# Patient Record
Sex: FEMALE | Race: WHITE | NOT HISPANIC OR LATINO
[De-identification: names, ages, dates, MRNs, and addresses within clinical notes are randomized per-mention and may not be internally consistent; named-entity substitution may affect disease eponyms.]

---

## 2017-08-24 ENCOUNTER — CHARTING TRANS (OUTPATIENT)
Dept: OTHER | Age: 52
End: 2017-08-24

## 2017-11-10 ENCOUNTER — LAB SERVICES (OUTPATIENT)
Dept: OTHER | Age: 52
End: 2017-11-10

## 2017-11-20 LAB
ALBUMIN SERPL-MCNC: 3.6 G/DL (ref 3.6–5.1)
ALBUMIN/GLOB SERPL: 1.1 (ref 1–2.4)
ALP SERPL-CCNC: 65 UNITS/L (ref 45–117)
ALT SERPL-CCNC: 24 UNITS/L
ANION GAP SERPL CALC-SCNC: 13 MMOL/L (ref 10–20)
APPEARANCE UR: CLEAR
AST SERPL-CCNC: 15 UNITS/L
BASOPHILS # BLD: 0.1 K/MCL (ref 0–0.3)
BASOPHILS NFR BLD: 1 %
BILIRUB SERPL-MCNC: 0.2 MG/DL (ref 0.2–1)
BILIRUB UR QL: NEGATIVE
BUN SERPL-MCNC: 14 MG/DL (ref 6–20)
BUN/CREAT SERPL: 22 (ref 7–25)
CALCIUM SERPL-MCNC: 8.1 MG/DL (ref 8.4–10.2)
CHLORIDE SERPL-SCNC: 106 MMOL/L (ref 98–107)
CHOLEST SERPL-MCNC: 167 MG/DL
CHOLEST/HDLC SERPL: 3.5
CO2 SERPL-SCNC: 26 MMOL/L (ref 21–32)
COLOR UR: ABNORMAL
CREAT SERPL-MCNC: 0.65 MG/DL (ref 0.51–0.95)
DIFFERENTIAL METHOD BLD: ABNORMAL
EOSINOPHIL # BLD: 0.2 K/MCL (ref 0.1–0.5)
EOSINOPHIL NFR BLD: 3 %
ERYTHROCYTE [DISTWIDTH] IN BLOOD: 14.8 % (ref 11–15)
GLOBULIN SER-MCNC: 3.4 G/DL (ref 2–4)
GLUCOSE SERPL-MCNC: 77 MG/DL (ref 65–99)
GLUCOSE UR-MCNC: NEGATIVE MG/DL
HDLC SERPL-MCNC: 48 MG/DL
HEMATOCRIT: 36.3 % (ref 36–46.5)
HEMOGLOBIN: 11.1 G/DL (ref 12–15.5)
KETONES UR-MCNC: NEGATIVE MG/DL
LDLC SERPL CALC-MCNC: 93 MG/DL
LENGTH OF FAST TIME PATIENT: 12 HRS
LENGTH OF FAST TIME PATIENT: 12 HRS
LYMPHOCYTES # BLD: 1.5 K/MCL (ref 1–4)
LYMPHOCYTES NFR BLD: 24 %
MEAN CORPUSCULAR HEMOGLOBIN: 24.1 PG (ref 26–34)
MEAN CORPUSCULAR HGB CONC: 30.6 G/DL (ref 32–36.5)
MEAN CORPUSCULAR VOLUME: 78.7 FL (ref 78–100)
MONOCYTES # BLD: 0.6 K/MCL (ref 0.3–0.9)
MONOCYTES NFR BLD: 10 %
NEUTROPHILS # BLD: 4.2 K/MCL (ref 1.8–7.7)
NEUTROPHILS NFR BLD: 62 %
NITRITE UR QL: NEGATIVE
NONHDLC SERPL-MCNC: 119 MG/DL
PH UR: 7 UNITS (ref 5–7)
PLATELET COUNT: 293 K/MCL (ref 140–450)
POTASSIUM SERPL-SCNC: 4.2 MMOL/L (ref 3.4–5.1)
PROT UR QL: NEGATIVE MG/DL
RBC-URINE: NEGATIVE
RED CELL COUNT: 4.61 MIL/MCL (ref 4–5.2)
SODIUM SERPL-SCNC: 141 MMOL/L (ref 135–145)
SP GR UR: 1.01 (ref 1–1.03)
SPECIMEN SOURCE: ABNORMAL
TOTAL PROTEIN: 7 G/DL (ref 6.4–8.2)
TRIGL SERPL-MCNC: 132 MG/DL
TSH SERPL-ACNC: 0.42 MCUNITS/ML (ref 0.35–5)
UROBILINOGEN UR QL: 0.2 MG/DL (ref 0–1)
WBC-URINE: NEGATIVE
WHITE BLOOD COUNT: 6.5 K/MCL (ref 4.2–11)

## 2018-03-19 ENCOUNTER — LAB SERVICES (OUTPATIENT)
Dept: OTHER | Age: 53
End: 2018-03-19

## 2018-03-19 ENCOUNTER — CHARTING TRANS (OUTPATIENT)
Dept: OTHER | Age: 53
End: 2018-03-19

## 2018-03-27 ENCOUNTER — CHARTING TRANS (OUTPATIENT)
Dept: OTHER | Age: 53
End: 2018-03-27

## 2018-03-27 LAB
25(OH)D3+25(OH)D2 SERPL-MCNC: 50.3 NG/ML (ref 30–100)
BASOPHILS # BLD: 0.1 K/MCL (ref 0–0.3)
BASOPHILS NFR BLD: 1 %
DIFFERENTIAL METHOD BLD: ABNORMAL
EOSINOPHIL # BLD: 0.2 K/MCL (ref 0.1–0.5)
EOSINOPHIL NFR BLD: 3 %
ERYTHROCYTE [DISTWIDTH] IN BLOOD: 15 % (ref 11–15)
FERRITIN SERPL-MCNC: 4 NG/ML (ref 8–252)
HEMATOCRIT: 37.2 % (ref 36–46.5)
HEMOCCULT STL QL: NEGATIVE
HEMOGLOBIN: 11 G/DL (ref 12–15.5)
LYMPHOCYTES # BLD: 1.7 K/MCL (ref 1–4)
LYMPHOCYTES NFR BLD: 28 %
MEAN CORPUSCULAR HEMOGLOBIN: 22.4 PG (ref 26–34)
MEAN CORPUSCULAR HGB CONC: 29.6 G/DL (ref 32–36.5)
MEAN CORPUSCULAR VOLUME: 75.9 FL (ref 78–100)
MONOCYTES # BLD: 0.8 K/MCL (ref 0.3–0.9)
MONOCYTES NFR BLD: 13 %
NEUTROPHILS # BLD: 3.4 K/MCL (ref 1.8–7.7)
NEUTROPHILS NFR BLD: 55 %
PLATELET COUNT: 249 K/MCL (ref 140–450)
RED CELL COUNT: 4.9 MIL/MCL (ref 4–5.2)
WHITE BLOOD COUNT: 6 K/MCL (ref 4.2–11)

## 2018-10-29 PROCEDURE — 88305 TISSUE EXAM BY PATHOLOGIST: CPT | Performed by: NURSE PRACTITIONER

## 2018-11-01 VITALS
DIASTOLIC BLOOD PRESSURE: 72 MMHG | WEIGHT: 173 LBS | TEMPERATURE: 97.7 F | BODY MASS INDEX: 24.77 KG/M2 | HEART RATE: 84 BPM | SYSTOLIC BLOOD PRESSURE: 116 MMHG | OXYGEN SATURATION: 98 % | HEIGHT: 70 IN

## 2018-11-03 VITALS
SYSTOLIC BLOOD PRESSURE: 124 MMHG | DIASTOLIC BLOOD PRESSURE: 81 MMHG | WEIGHT: 175.38 LBS | TEMPERATURE: 98 F | HEART RATE: 66 BPM | RESPIRATION RATE: 16 BRPM | HEIGHT: 70 IN | BODY MASS INDEX: 25.11 KG/M2

## 2020-02-07 ENCOUNTER — WALK IN (OUTPATIENT)
Dept: URGENT CARE | Age: 55
End: 2020-02-07

## 2020-02-07 DIAGNOSIS — J11.1 INFLUENZA: Primary | ICD-10-CM

## 2020-02-07 PROCEDURE — 99213 OFFICE O/P EST LOW 20 MIN: CPT | Performed by: NURSE PRACTITIONER

## 2020-02-07 RX ORDER — FLUTICASONE PROPIONATE 50 MCG
SPRAY, SUSPENSION (ML) NASAL
COMMUNITY

## 2020-02-07 RX ORDER — PAROXETINE 10 MG/1
TABLET, FILM COATED ORAL
Refills: 11 | COMMUNITY
Start: 2020-01-02

## 2020-02-07 SDOH — HEALTH STABILITY: PHYSICAL HEALTH: ON AVERAGE, HOW MANY MINUTES DO YOU ENGAGE IN EXERCISE AT THIS LEVEL?: 30 MIN

## 2020-02-07 SDOH — HEALTH STABILITY: MENTAL HEALTH: HOW OFTEN DO YOU HAVE A DRINK CONTAINING ALCOHOL?: 2-3 TIMES A WEEK

## 2020-02-07 SDOH — HEALTH STABILITY: PHYSICAL HEALTH: ON AVERAGE, HOW MANY DAYS PER WEEK DO YOU ENGAGE IN MODERATE TO STRENUOUS EXERCISE (LIKE A BRISK WALK)?: 2 DAYS

## 2020-02-07 ASSESSMENT — PAIN SCALES - GENERAL: PAINLEVEL: 7-8

## 2020-02-07 ASSESSMENT — ENCOUNTER SYMPTOMS
SHORTNESS OF BREATH: 0
RHINORRHEA: 0
LIGHT-HEADEDNESS: 0
HEADACHES: 0
FEVER: 1
VOMITING: 0
APPETITE CHANGE: 0
COLOR CHANGE: 0
FATIGUE: 1
WHEEZING: 0
CHILLS: 1
SINUS PAIN: 1
NUMBNESS: 0
DIZZINESS: 0
ADENOPATHY: 0
DIAPHORESIS: 1
BRUISES/BLEEDS EASILY: 0
ACTIVITY CHANGE: 1
COUGH: 1
DIARRHEA: 0
NAUSEA: 0
ABDOMINAL PAIN: 0
CONSTIPATION: 0
ABDOMINAL DISTENTION: 0
SINUS PRESSURE: 1
CHEST TIGHTNESS: 0

## 2020-10-21 ENCOUNTER — OFFICE VISIT (OUTPATIENT)
Dept: FAMILY MEDICINE CLINIC | Facility: CLINIC | Age: 55
End: 2020-10-21
Payer: COMMERCIAL

## 2020-10-21 VITALS
RESPIRATION RATE: 16 BRPM | SYSTOLIC BLOOD PRESSURE: 122 MMHG | HEART RATE: 75 BPM | DIASTOLIC BLOOD PRESSURE: 70 MMHG | BODY MASS INDEX: 28.02 KG/M2 | TEMPERATURE: 97 F | WEIGHT: 187 LBS | HEIGHT: 68.5 IN | OXYGEN SATURATION: 95 %

## 2020-10-21 DIAGNOSIS — Z00.00 ROUTINE GENERAL MEDICAL EXAMINATION AT A HEALTH CARE FACILITY: Primary | ICD-10-CM

## 2020-10-21 DIAGNOSIS — Z12.11 ENCOUNTER FOR HEMOCCULT SCREENING: ICD-10-CM

## 2020-10-21 DIAGNOSIS — Z86.2 HISTORY OF ANEMIA: ICD-10-CM

## 2020-10-21 DIAGNOSIS — Z12.11 SCREENING FOR MALIGNANT NEOPLASM OF COLON: ICD-10-CM

## 2020-10-21 DIAGNOSIS — Z79.899 ENCOUNTER FOR LONG-TERM CURRENT USE OF MEDICATION: ICD-10-CM

## 2020-10-21 DIAGNOSIS — G43.909 MIGRAINE WITHOUT STATUS MIGRAINOSUS, NOT INTRACTABLE, UNSPECIFIED MIGRAINE TYPE: ICD-10-CM

## 2020-10-21 PROCEDURE — 99386 PREV VISIT NEW AGE 40-64: CPT | Performed by: FAMILY MEDICINE

## 2020-10-21 PROCEDURE — 3078F DIAST BP <80 MM HG: CPT | Performed by: FAMILY MEDICINE

## 2020-10-21 PROCEDURE — 3074F SYST BP LT 130 MM HG: CPT | Performed by: FAMILY MEDICINE

## 2020-10-21 PROCEDURE — 90686 IIV4 VACC NO PRSV 0.5 ML IM: CPT | Performed by: FAMILY MEDICINE

## 2020-10-21 PROCEDURE — 90471 IMMUNIZATION ADMIN: CPT | Performed by: FAMILY MEDICINE

## 2020-10-21 PROCEDURE — 99213 OFFICE O/P EST LOW 20 MIN: CPT | Performed by: FAMILY MEDICINE

## 2020-10-21 PROCEDURE — 3008F BODY MASS INDEX DOCD: CPT | Performed by: FAMILY MEDICINE

## 2020-10-21 RX ORDER — ELETRIPTAN HYDROBROMIDE 20 MG/1
20 TABLET, FILM COATED ORAL AS NEEDED
Qty: 27 TABLET | Refills: 1 | Status: SHIPPED | OUTPATIENT
Start: 2020-10-21 | End: 2021-10-25

## 2020-10-21 NOTE — PROGRESS NOTES
HPI:   Maynor Goodman is a 48year old female     New patient. Last PAP: UTD  Last colonoscopy: n/a  Last mammogram: scheduled, ordered by gyne      Migraine headaches:  Most frequently around the time of her menses.   Other triggers include when she get Years since quittin.8      Smokeless tobacco: Never Used    Alcohol use: Yes      Frequency: 2-4 times a month      Drinks per session: 3 or 4      Comment: socially    Drug use: No    Occ: [facility] [place] as .  Marital cyanosis, or edema  NEURO: oriented times three, cranial nerves are grossly intact, no gross motor or sensory deficit.   PSYCH: normal affect        Immunization History  Administered            Date(s) Administered    FLULAVAL 6 months & older 0.5 ml Prefi as needed. Avoid known triggers when able. Maintain good hydration. Sleep hygiene discussed. Try to avoid taking Relpax more than twice a week as this can increase risk of rebound headaches. - Eletriptan Hydrobromide 20 MG Oral Tab;  Take 1 tablet (2

## 2020-10-22 ENCOUNTER — TELEPHONE (OUTPATIENT)
Dept: FAMILY MEDICINE CLINIC | Facility: CLINIC | Age: 55
End: 2020-10-22

## 2020-10-22 NOTE — TELEPHONE ENCOUNTER
Eletriptan Hydrobromide 20 MG Oral Tab 27 tablet 1 10/21/2020    Sig:   Take 1 tablet (20 mg total) by mouth as needed for Migraine. Per Paul, they would like to confirm instructions are correct.  Medication is to be taken as needed only for migr

## 2020-10-24 ENCOUNTER — LAB ENCOUNTER (OUTPATIENT)
Dept: LAB | Age: 55
End: 2020-10-24
Attending: FAMILY MEDICINE
Payer: COMMERCIAL

## 2020-10-24 DIAGNOSIS — Z86.2 HISTORY OF ANEMIA: ICD-10-CM

## 2020-10-24 DIAGNOSIS — Z00.00 ROUTINE GENERAL MEDICAL EXAMINATION AT A HEALTH CARE FACILITY: ICD-10-CM

## 2020-10-24 PROCEDURE — 83540 ASSAY OF IRON: CPT | Performed by: FAMILY MEDICINE

## 2020-10-24 PROCEDURE — 36415 COLL VENOUS BLD VENIPUNCTURE: CPT | Performed by: FAMILY MEDICINE

## 2020-10-24 PROCEDURE — 80061 LIPID PANEL: CPT | Performed by: FAMILY MEDICINE

## 2020-10-24 PROCEDURE — 82728 ASSAY OF FERRITIN: CPT | Performed by: FAMILY MEDICINE

## 2020-10-24 PROCEDURE — 82607 VITAMIN B-12: CPT | Performed by: FAMILY MEDICINE

## 2020-10-24 PROCEDURE — 83550 IRON BINDING TEST: CPT | Performed by: FAMILY MEDICINE

## 2020-10-24 PROCEDURE — 84439 ASSAY OF FREE THYROXINE: CPT | Performed by: FAMILY MEDICINE

## 2020-10-24 PROCEDURE — 80050 GENERAL HEALTH PANEL: CPT | Performed by: FAMILY MEDICINE

## 2020-10-25 PROBLEM — Z79.899 ENCOUNTER FOR LONG-TERM CURRENT USE OF MEDICATION: Status: ACTIVE | Noted: 2020-10-25

## 2020-11-18 ENCOUNTER — TELEMEDICINE (OUTPATIENT)
Dept: FAMILY MEDICINE CLINIC | Facility: CLINIC | Age: 55
End: 2020-11-18
Payer: COMMERCIAL

## 2020-11-18 DIAGNOSIS — E61.1 IRON DEFICIENCY: Primary | ICD-10-CM

## 2020-11-18 PROCEDURE — 99213 OFFICE O/P EST LOW 20 MIN: CPT | Performed by: FAMILY MEDICINE

## 2020-11-18 NOTE — PROGRESS NOTES
Doximity video visit with synchronous audio and video    [address] verbally consents to a Doximity video visit with synchronous audio and video service on 11/18/20.   Patient has been referred to the [facility] website at [email or web address] to review List:     Migraine without status migrainosus, not intractable     Encounter for long-term current use of medication     Iron deficiency     Past Medical History:   Diagnosis Date   • Anemia    • Hx of migraines    • Seasonal allergies       Social History Lymphocytes Absolute      1.00 - 4.00 x10(3) uL  1.67 1.88   Monocytes Absolute      0.10 - 1.00 x10(3) uL  0.86 0.97 (H)   Eosinophils Absolute      0.00 - 0.70 x10(3) uL  0.16 0.19   Basophils Absolute      0.00 - 0.20 x10(3) uL  0.07 0.07   Immature G Bind.Cap.(TIBC)      240 - 450 ug/dL  398    Iron Saturation      15 - 50 %  11 (L)    TSH      0.358 - 3.740 mIU/mL  1.140    T4,Free (Direct)      0.8 - 1.7 ng/dL  1.1    FERRITIN      18.0 - 340.0 ng/mL  12.3 (L)    Vitamin B12      193 - 986 pg/mL  652

## 2020-11-18 NOTE — PATIENT INSTRUCTIONS
-For the iron deficiency, take OTC iron supplement together with a vitamin C supplement twice a day with meals for a month, then recheck iron levels.   To help with constipation be sure to drink at least 64 ounces of water every day, eat ple

## 2021-01-23 ENCOUNTER — LAB ENCOUNTER (OUTPATIENT)
Dept: LAB | Age: 56
End: 2021-01-23
Attending: FAMILY MEDICINE
Payer: COMMERCIAL

## 2021-01-23 DIAGNOSIS — Z12.11 ENCOUNTER FOR HEMOCCULT SCREENING: ICD-10-CM

## 2021-01-23 DIAGNOSIS — E61.1 IRON DEFICIENCY: ICD-10-CM

## 2021-01-23 LAB
DEPRECATED HBV CORE AB SER IA-ACNC: 47.6 NG/ML
IRON SATURATION: 18 %
IRON SERPL-MCNC: 60 UG/DL
TOTAL IRON BINDING CAPACITY: 334 UG/DL (ref 240–450)
TRANSFERRIN SERPL-MCNC: 224 MG/DL (ref 200–360)

## 2021-01-23 PROCEDURE — 82728 ASSAY OF FERRITIN: CPT | Performed by: FAMILY MEDICINE

## 2021-01-23 PROCEDURE — 36415 COLL VENOUS BLD VENIPUNCTURE: CPT | Performed by: FAMILY MEDICINE

## 2021-01-23 PROCEDURE — 83540 ASSAY OF IRON: CPT | Performed by: FAMILY MEDICINE

## 2021-01-23 PROCEDURE — 83550 IRON BINDING TEST: CPT | Performed by: FAMILY MEDICINE

## 2021-02-22 ENCOUNTER — TELEPHONE (OUTPATIENT)
Dept: FAMILY MEDICINE CLINIC | Facility: CLINIC | Age: 56
End: 2021-02-22

## 2021-02-23 NOTE — TELEPHONE ENCOUNTER
Please call patient:  Please inform patient that her chart was brought to my attention because she has not had her screening colonoscopy and has not done the stool Hemoccult screening.   Please advise patient to make an appointment to see Dr. Latonia Lopez

## 2021-02-24 NOTE — TELEPHONE ENCOUNTER
Patient called back. Relayed recommendations. States she prefers to make appointment with Dr. Mickael Schwab for colonoscopy.    Relayed provider information

## 2021-02-24 NOTE — TELEPHONE ENCOUNTER
Second message left for patient to call back.    Sent recommendations via [facility]

## 2021-03-23 ENCOUNTER — OFFICE VISIT (OUTPATIENT)
Dept: SURGERY | Facility: CLINIC | Age: 56
End: 2021-03-23
Payer: COMMERCIAL

## 2021-03-23 VITALS
TEMPERATURE: 97 F | HEIGHT: 70 IN | DIASTOLIC BLOOD PRESSURE: 83 MMHG | BODY MASS INDEX: 27.4 KG/M2 | HEART RATE: 82 BPM | WEIGHT: 191.38 LBS | SYSTOLIC BLOOD PRESSURE: 120 MMHG

## 2021-03-23 DIAGNOSIS — Z12.11 ENCOUNTER FOR SCREENING COLONOSCOPY: Primary | ICD-10-CM

## 2021-03-23 PROCEDURE — 3079F DIAST BP 80-89 MM HG: CPT | Performed by: COLON & RECTAL SURGERY

## 2021-03-23 PROCEDURE — S0285 CNSLT BEFORE SCREEN COLONOSC: HCPCS | Performed by: COLON & RECTAL SURGERY

## 2021-03-23 PROCEDURE — 3008F BODY MASS INDEX DOCD: CPT | Performed by: COLON & RECTAL SURGERY

## 2021-03-23 PROCEDURE — 3074F SYST BP LT 130 MM HG: CPT | Performed by: COLON & RECTAL SURGERY

## 2021-03-23 RX ORDER — SODIUM, POTASSIUM,MAG SULFATES 17.5-3.13G
SOLUTION, RECONSTITUTED, ORAL ORAL
Qty: 1 KIT | Refills: 0 | Status: SHIPPED | OUTPATIENT
Start: 2021-03-23

## 2021-03-23 NOTE — PROGRESS NOTES
Follow Up Visit Note       Active Problems      1. Encounter for screening colonoscopy          Chief Complaint   Patient presents with:  Colonoscopy: Colonoscopy - No symptoms. Pt states has low iron. Pt denies fever, nausea or vomiting.         History care, and communication of any results that were available at today's visit. Allergies  Alba has No Known Allergies. Past Medical / Surgical / Social / Family History    The past medical and past surgical history have been reviewed by me today. with Friends and Family:       Frequency of Social Gatherings with Friends and Family:       Attends Anabaptism Services:       Active Member of Clubs or Organizations:       Attends Club or Organization Meetings:       Marital Status:   Intimate Partner Vi Negative for color change and rash. Neurological: Negative for tremors, syncope and weakness. Hematological: Negative for adenopathy. Does not bruise/bleed easily. Psychiatric/Behavioral: Negative for behavioral problems and sleep disturbance. kg/m².    Musculoskeletal:         General: Normal range of motion. Cervical back: Normal range of motion and neck supple. Lymphadenopathy:      Cervical: No cervical adenopathy. Skin:     General: Skin is warm and dry. Findings: No rash.    Anamaria Palacio the procedure(s) possible outcomes was fully discussed. The patient seemed to understand the conversation and its details. Consent for the procedure(s) was confirmed with the patient. No orders of the defined types were placed in this encounter.

## 2021-03-23 NOTE — PATIENT INSTRUCTIONS
The patient presents today in consultation for a colonoscopy. The patient has never had a prior colonoscopy. The patient denies having any blood, mucus, or dark tarry stools. The patient denies having any narrowing of the stools.      The patient d

## 2021-03-31 ENCOUNTER — TELEPHONE (OUTPATIENT)
Dept: SURGERY | Facility: CLINIC | Age: 56
End: 2021-03-31

## 2021-05-21 ENCOUNTER — TELEPHONE (OUTPATIENT)
Dept: FAMILY MEDICINE CLINIC | Facility: CLINIC | Age: 56
End: 2021-05-21

## 2021-05-21 NOTE — TELEPHONE ENCOUNTER
Received a fax requesting prior auth on eletriptan. Phoned [phone] per request for prior auth. Plan states no prior auth needed if ran without unit dose. Provided Eileen Pickard  code [phone] to pharmacy.

## 2021-05-26 VITALS
DIASTOLIC BLOOD PRESSURE: 70 MMHG | OXYGEN SATURATION: 96 % | RESPIRATION RATE: 16 BRPM | BODY MASS INDEX: 25.77 KG/M2 | WEIGHT: 180 LBS | HEIGHT: 70 IN | TEMPERATURE: 97.9 F | HEART RATE: 76 BPM | SYSTOLIC BLOOD PRESSURE: 110 MMHG

## 2021-07-23 ENCOUNTER — TELEPHONE (OUTPATIENT)
Dept: FAMILY MEDICINE CLINIC | Facility: CLINIC | Age: 56
End: 2021-07-23

## 2021-07-23 NOTE — TELEPHONE ENCOUNTER
Received PA request from [employer] [facility] [employer] for Eleptriptan 20mg tabs. Phoned plan , Bennet Dakins [phone]. They will fax PA form. States after filled out, send back and they will process and send to [place].   States Paul needs to [facility]

## 2021-09-16 ENCOUNTER — TELEPHONE (OUTPATIENT)
Dept: SURGERY | Facility: CLINIC | Age: 56
End: 2021-09-16

## 2021-09-16 NOTE — TELEPHONE ENCOUNTER
Patient CX Colonoscopy on 9-23 at [facility]> Her father is very ill and most likely going into hospice. She will call back at a later date to reschedule her procedure.

## 2021-12-29 ENCOUNTER — TELEPHONE (OUTPATIENT)
Dept: FAMILY MEDICINE CLINIC | Facility: CLINIC | Age: 56
End: 2021-12-29

## 2021-12-29 NOTE — TELEPHONE ENCOUNTER
Patient c/o sinus pressure and congestion x6 days. Reports symptoms are improving but she is more annoyed with the runny nose and congestion.      Patient advised she can proceed to walk in clinic for evaluation or can continue at home comfort measures: res

## 2021-12-29 NOTE — TELEPHONE ENCOUNTER
Pt called and said she is pretty sure she has a sinus infection because her teeth hurt and she has all the symptoms. She was tested for COVID and was negative. No appts available.

## 2022-03-23 ENCOUNTER — OFFICE VISIT (OUTPATIENT)
Dept: FAMILY MEDICINE CLINIC | Facility: CLINIC | Age: 57
End: 2022-03-23
Payer: COMMERCIAL

## 2022-03-23 VITALS
BODY MASS INDEX: 28.19 KG/M2 | OXYGEN SATURATION: 97 % | HEART RATE: 77 BPM | HEIGHT: 68.11 IN | WEIGHT: 186 LBS | SYSTOLIC BLOOD PRESSURE: 126 MMHG | DIASTOLIC BLOOD PRESSURE: 74 MMHG

## 2022-03-23 DIAGNOSIS — Z13.0 SCREENING FOR BLOOD DISEASE: ICD-10-CM

## 2022-03-23 DIAGNOSIS — Z13.1 SCREENING FOR DIABETES MELLITUS: ICD-10-CM

## 2022-03-23 DIAGNOSIS — Z00.00 ROUTINE GENERAL MEDICAL EXAMINATION AT A HEALTH CARE FACILITY: Primary | ICD-10-CM

## 2022-03-23 DIAGNOSIS — E66.3 OVERWEIGHT WITH BODY MASS INDEX (BMI) OF 28 TO 28.9 IN ADULT: ICD-10-CM

## 2022-03-23 DIAGNOSIS — Z13.6 SCREENING FOR HEART DISEASE: ICD-10-CM

## 2022-03-23 DIAGNOSIS — E61.1 IRON DEFICIENCY: ICD-10-CM

## 2022-03-23 DIAGNOSIS — Z12.11 ENCOUNTER FOR SCREENING FOR MALIGNANT NEOPLASM OF COLON: ICD-10-CM

## 2022-03-23 DIAGNOSIS — Z13.29 SCREENING FOR THYROID DISORDER: ICD-10-CM

## 2022-03-23 PROBLEM — Z79.899 ENCOUNTER FOR LONG-TERM CURRENT USE OF MEDICATION: Status: RESOLVED | Noted: 2020-10-25 | Resolved: 2022-03-23

## 2022-03-23 PROCEDURE — 3008F BODY MASS INDEX DOCD: CPT | Performed by: FAMILY MEDICINE

## 2022-03-23 PROCEDURE — 3078F DIAST BP <80 MM HG: CPT | Performed by: FAMILY MEDICINE

## 2022-03-23 PROCEDURE — 99396 PREV VISIT EST AGE 40-64: CPT | Performed by: FAMILY MEDICINE

## 2022-03-23 PROCEDURE — 3074F SYST BP LT 130 MM HG: CPT | Performed by: FAMILY MEDICINE

## 2022-03-23 RX ORDER — PYRIDOXINE HCL (VITAMIN B6) 50 MG
100 TABLET ORAL DAILY
COMMUNITY

## 2022-03-23 RX ORDER — OMEGA-3/DHA/EPA/FISH OIL 60 MG-90MG
CAPSULE ORAL
COMMUNITY

## 2022-03-23 RX ORDER — CHOLECALCIFEROL (VITAMIN D3) 125 MCG
5000 CAPSULE ORAL NIGHTLY
COMMUNITY
Start: 2021-09-01

## 2022-04-15 ENCOUNTER — LAB ENCOUNTER (OUTPATIENT)
Dept: LAB | Age: 57
End: 2022-04-15
Attending: FAMILY MEDICINE
Payer: COMMERCIAL

## 2022-04-15 DIAGNOSIS — E61.1 IRON DEFICIENCY: ICD-10-CM

## 2022-04-15 DIAGNOSIS — Z13.1 SCREENING FOR DIABETES MELLITUS: ICD-10-CM

## 2022-04-15 DIAGNOSIS — Z13.29 SCREENING FOR THYROID DISORDER: ICD-10-CM

## 2022-04-15 DIAGNOSIS — Z13.0 SCREENING FOR BLOOD DISEASE: ICD-10-CM

## 2022-04-15 DIAGNOSIS — Z13.6 SCREENING FOR HEART DISEASE: ICD-10-CM

## 2022-04-15 LAB
ALBUMIN SERPL-MCNC: 3.9 G/DL (ref 3.4–5)
ALBUMIN/GLOB SERPL: 1.1 {RATIO} (ref 1–2)
ALP LIVER SERPL-CCNC: 74 U/L
ALT SERPL-CCNC: 51 U/L
ANION GAP SERPL CALC-SCNC: 5 MMOL/L (ref 0–18)
AST SERPL-CCNC: 34 U/L (ref 15–37)
BASOPHILS # BLD AUTO: 0.05 X10(3) UL (ref 0–0.2)
BASOPHILS NFR BLD AUTO: 0.8 %
BILIRUB SERPL-MCNC: 0.6 MG/DL (ref 0.1–2)
BUN BLD-MCNC: 15 MG/DL (ref 7–18)
CALCIUM BLD-MCNC: 9.4 MG/DL (ref 8.5–10.1)
CHLORIDE SERPL-SCNC: 108 MMOL/L (ref 98–112)
CHOLEST SERPL-MCNC: 219 MG/DL (ref ?–200)
CO2 SERPL-SCNC: 28 MMOL/L (ref 21–32)
CREAT BLD-MCNC: 0.71 MG/DL
DEPRECATED HBV CORE AB SER IA-ACNC: 76.4 NG/ML
EOSINOPHIL # BLD AUTO: 0.13 X10(3) UL (ref 0–0.7)
EOSINOPHIL NFR BLD AUTO: 2.2 %
ERYTHROCYTE [DISTWIDTH] IN BLOOD BY AUTOMATED COUNT: 13.1 %
FASTING PATIENT LIPID ANSWER: YES
FASTING STATUS PATIENT QL REPORTED: YES
GLOBULIN PLAS-MCNC: 3.4 G/DL (ref 2.8–4.4)
GLUCOSE BLD-MCNC: 91 MG/DL (ref 70–99)
HCT VFR BLD AUTO: 47.9 %
HDLC SERPL-MCNC: 46 MG/DL (ref 40–59)
HGB BLD-MCNC: 15.1 G/DL
IMM GRANULOCYTES # BLD AUTO: 0.01 X10(3) UL (ref 0–1)
IMM GRANULOCYTES NFR BLD: 0.2 %
IRON SATN MFR SERPL: 17 %
IRON SERPL-MCNC: 64 UG/DL
LDLC SERPL CALC-MCNC: 150 MG/DL (ref ?–100)
LYMPHOCYTES # BLD AUTO: 1.62 X10(3) UL (ref 1–4)
LYMPHOCYTES NFR BLD AUTO: 27.2 %
MCH RBC QN AUTO: 28.1 PG (ref 26–34)
MCHC RBC AUTO-ENTMCNC: 31.5 G/DL (ref 31–37)
MCV RBC AUTO: 89.2 FL
MONOCYTES # BLD AUTO: 0.61 X10(3) UL (ref 0.1–1)
MONOCYTES NFR BLD AUTO: 10.2 %
NEUTROPHILS # BLD AUTO: 3.54 X10 (3) UL (ref 1.5–7.7)
NEUTROPHILS # BLD AUTO: 3.54 X10(3) UL (ref 1.5–7.7)
NEUTROPHILS NFR BLD AUTO: 59.4 %
NONHDLC SERPL-MCNC: 173 MG/DL (ref ?–130)
OSMOLALITY SERPL CALC.SUM OF ELEC: 292 MOSM/KG (ref 275–295)
PLATELET # BLD AUTO: 275 10(3)UL (ref 150–450)
POTASSIUM SERPL-SCNC: 4.4 MMOL/L (ref 3.5–5.1)
PROT SERPL-MCNC: 7.3 G/DL (ref 6.4–8.2)
RBC # BLD AUTO: 5.37 X10(6)UL
SODIUM SERPL-SCNC: 141 MMOL/L (ref 136–145)
T4 FREE SERPL-MCNC: 1.2 NG/DL (ref 0.8–1.7)
TIBC SERPL-MCNC: 367 UG/DL (ref 240–450)
TRANSFERRIN SERPL-MCNC: 246 MG/DL (ref 200–360)
TRIGL SERPL-MCNC: 125 MG/DL (ref 30–149)
TSI SER-ACNC: 0.63 MIU/ML (ref 0.36–3.74)
VLDLC SERPL CALC-MCNC: 24 MG/DL (ref 0–30)
WBC # BLD AUTO: 6 X10(3) UL (ref 4–11)

## 2022-04-15 PROCEDURE — 84443 ASSAY THYROID STIM HORMONE: CPT

## 2022-04-15 PROCEDURE — 83550 IRON BINDING TEST: CPT

## 2022-04-15 PROCEDURE — 36415 COLL VENOUS BLD VENIPUNCTURE: CPT

## 2022-04-15 PROCEDURE — 85025 COMPLETE CBC W/AUTO DIFF WBC: CPT

## 2022-04-15 PROCEDURE — 84439 ASSAY OF FREE THYROXINE: CPT

## 2022-04-15 PROCEDURE — 80053 COMPREHEN METABOLIC PANEL: CPT

## 2022-04-15 PROCEDURE — 83540 ASSAY OF IRON: CPT

## 2022-04-15 PROCEDURE — 80061 LIPID PANEL: CPT

## 2022-04-15 PROCEDURE — 82728 ASSAY OF FERRITIN: CPT

## 2022-06-10 ENCOUNTER — TELEMEDICINE (OUTPATIENT)
Dept: FAMILY MEDICINE CLINIC | Facility: CLINIC | Age: 57
End: 2022-06-10

## 2022-06-10 VITALS — TEMPERATURE: 98 F

## 2022-06-10 DIAGNOSIS — J01.00 ACUTE NON-RECURRENT MAXILLARY SINUSITIS: Primary | ICD-10-CM

## 2022-06-10 DIAGNOSIS — Z86.69 HX OF MIGRAINES: ICD-10-CM

## 2022-06-10 PROCEDURE — 99213 OFFICE O/P EST LOW 20 MIN: CPT | Performed by: FAMILY MEDICINE

## 2022-06-10 RX ORDER — AMOXICILLIN AND CLAVULANATE POTASSIUM 875; 125 MG/1; MG/1
1 TABLET, FILM COATED ORAL 2 TIMES DAILY
Qty: 20 TABLET | Refills: 0 | Status: SHIPPED | OUTPATIENT
Start: 2022-06-10

## 2022-06-10 RX ORDER — ELETRIPTAN HYDROBROMIDE 40 MG/1
40 TABLET, FILM COATED ORAL AS NEEDED
Qty: 14 TABLET | Refills: 3 | Status: SHIPPED | OUTPATIENT
Start: 2022-06-10

## 2022-06-10 RX ORDER — FLUTICASONE PROPIONATE 50 MCG
SPRAY, SUSPENSION (ML) NASAL
Qty: 1 EACH | Refills: 1 | Status: SHIPPED | OUTPATIENT
Start: 2022-06-10

## 2022-06-10 RX ORDER — RIBOFLAVIN (VITAMIN B2) 100 MG
100 TABLET ORAL DAILY
COMMUNITY

## 2022-06-10 RX ORDER — PREDNISONE 20 MG/1
TABLET ORAL
Qty: 10 TABLET | Refills: 0 | Status: SHIPPED | OUTPATIENT
Start: 2022-06-10

## 2022-06-10 RX ORDER — CALCIUM CARBONATE 260MG(650)
TABLET,CHEWABLE ORAL
COMMUNITY

## 2023-01-31 DIAGNOSIS — Z86.69 HX OF MIGRAINES: ICD-10-CM

## 2023-02-02 RX ORDER — ELETRIPTAN HYDROBROMIDE 40 MG/1
40 TABLET, FILM COATED ORAL AS NEEDED
Qty: 14 TABLET | Refills: 0 | Status: SHIPPED | OUTPATIENT
Start: 2023-02-02

## 2023-05-26 ENCOUNTER — OFFICE VISIT (OUTPATIENT)
Dept: FAMILY MEDICINE CLINIC | Facility: CLINIC | Age: 58
End: 2023-05-26
Payer: COMMERCIAL

## 2023-05-26 VITALS
HEART RATE: 74 BPM | OXYGEN SATURATION: 98 % | BODY MASS INDEX: 26.98 KG/M2 | WEIGHT: 178 LBS | RESPIRATION RATE: 18 BRPM | HEIGHT: 68 IN | DIASTOLIC BLOOD PRESSURE: 74 MMHG | TEMPERATURE: 98 F | SYSTOLIC BLOOD PRESSURE: 134 MMHG

## 2023-05-26 DIAGNOSIS — G43.909 MIGRAINE WITHOUT STATUS MIGRAINOSUS, NOT INTRACTABLE, UNSPECIFIED MIGRAINE TYPE: ICD-10-CM

## 2023-05-26 DIAGNOSIS — Z00.00 ROUTINE GENERAL MEDICAL EXAMINATION AT A HEALTH CARE FACILITY: Primary | ICD-10-CM

## 2023-05-26 DIAGNOSIS — F41.1 GAD (GENERALIZED ANXIETY DISORDER): ICD-10-CM

## 2023-05-26 DIAGNOSIS — E78.00 HYPERCHOLESTEROLEMIA: ICD-10-CM

## 2023-05-26 DIAGNOSIS — Z76.89 ENCOUNTER FOR NEW MEDICATION PRESCRIPTION: ICD-10-CM

## 2023-05-26 RX ORDER — ELETRIPTAN HYDROBROMIDE 40 MG/1
40 TABLET, FILM COATED ORAL AS NEEDED
Qty: 9 TABLET | Refills: 2 | Status: SHIPPED | OUTPATIENT
Start: 2023-05-26

## 2023-05-26 RX ORDER — ESCITALOPRAM OXALATE 5 MG/1
5 TABLET ORAL DAILY
Qty: 30 TABLET | Refills: 1 | Status: SHIPPED | OUTPATIENT
Start: 2023-05-26

## 2023-06-19 ENCOUNTER — LAB ENCOUNTER (OUTPATIENT)
Dept: LAB | Age: 58
End: 2023-06-19
Attending: FAMILY MEDICINE
Payer: COMMERCIAL

## 2023-06-19 DIAGNOSIS — E78.00 HYPERCHOLESTEROLEMIA: ICD-10-CM

## 2023-06-19 DIAGNOSIS — Z00.00 ROUTINE GENERAL MEDICAL EXAMINATION AT A HEALTH CARE FACILITY: ICD-10-CM

## 2023-06-19 LAB
ALBUMIN SERPL-MCNC: 3.6 G/DL (ref 3.4–5)
ALBUMIN/GLOB SERPL: 0.9 {RATIO} (ref 1–2)
ALP LIVER SERPL-CCNC: 74 U/L
ALT SERPL-CCNC: 32 U/L
ANION GAP SERPL CALC-SCNC: 7 MMOL/L (ref 0–18)
AST SERPL-CCNC: 28 U/L (ref 15–37)
BASOPHILS # BLD AUTO: 0.05 X10(3) UL (ref 0–0.2)
BASOPHILS NFR BLD AUTO: 0.8 %
BILIRUB SERPL-MCNC: 0.6 MG/DL (ref 0.1–2)
BUN BLD-MCNC: 14 MG/DL (ref 7–18)
CALCIUM BLD-MCNC: 9.5 MG/DL (ref 8.5–10.1)
CHLORIDE SERPL-SCNC: 107 MMOL/L (ref 98–112)
CHOLEST SERPL-MCNC: 195 MG/DL (ref ?–200)
CO2 SERPL-SCNC: 27 MMOL/L (ref 21–32)
CREAT BLD-MCNC: 0.6 MG/DL
EOSINOPHIL # BLD AUTO: 0.12 X10(3) UL (ref 0–0.7)
EOSINOPHIL NFR BLD AUTO: 1.9 %
ERYTHROCYTE [DISTWIDTH] IN BLOOD BY AUTOMATED COUNT: 13.1 %
EST. AVERAGE GLUCOSE BLD GHB EST-MCNC: 120 MG/DL (ref 68–126)
FASTING PATIENT LIPID ANSWER: YES
FASTING STATUS PATIENT QL REPORTED: YES
GFR SERPLBLD BASED ON 1.73 SQ M-ARVRAT: 105 ML/MIN/1.73M2 (ref 60–?)
GLOBULIN PLAS-MCNC: 4 G/DL (ref 2.8–4.4)
GLUCOSE BLD-MCNC: 94 MG/DL (ref 70–99)
HBA1C MFR BLD: 5.8 % (ref ?–5.7)
HCT VFR BLD AUTO: 46.8 %
HDLC SERPL-MCNC: 52 MG/DL (ref 40–59)
HGB BLD-MCNC: 14.5 G/DL
IMM GRANULOCYTES # BLD AUTO: 0.01 X10(3) UL (ref 0–1)
IMM GRANULOCYTES NFR BLD: 0.2 %
LDLC SERPL CALC-MCNC: 122 MG/DL (ref ?–100)
LYMPHOCYTES # BLD AUTO: 1.86 X10(3) UL (ref 1–4)
LYMPHOCYTES NFR BLD AUTO: 29.8 %
MCH RBC QN AUTO: 27.5 PG (ref 26–34)
MCHC RBC AUTO-ENTMCNC: 31 G/DL (ref 31–37)
MCV RBC AUTO: 88.6 FL
MONOCYTES # BLD AUTO: 0.6 X10(3) UL (ref 0.1–1)
MONOCYTES NFR BLD AUTO: 9.6 %
NEUTROPHILS # BLD AUTO: 3.6 X10 (3) UL (ref 1.5–7.7)
NEUTROPHILS # BLD AUTO: 3.6 X10(3) UL (ref 1.5–7.7)
NEUTROPHILS NFR BLD AUTO: 57.7 %
NONHDLC SERPL-MCNC: 143 MG/DL (ref ?–130)
OSMOLALITY SERPL CALC.SUM OF ELEC: 292 MOSM/KG (ref 275–295)
PLATELET # BLD AUTO: 260 10(3)UL (ref 150–450)
POTASSIUM SERPL-SCNC: 4.2 MMOL/L (ref 3.5–5.1)
PROT SERPL-MCNC: 7.6 G/DL (ref 6.4–8.2)
RBC # BLD AUTO: 5.28 X10(6)UL
SODIUM SERPL-SCNC: 141 MMOL/L (ref 136–145)
T4 FREE SERPL-MCNC: 1.1 NG/DL (ref 0.8–1.7)
TRIGL SERPL-MCNC: 120 MG/DL (ref 30–149)
TSI SER-ACNC: 0.71 MIU/ML (ref 0.36–3.74)
VLDLC SERPL CALC-MCNC: 21 MG/DL (ref 0–30)
WBC # BLD AUTO: 6.2 X10(3) UL (ref 4–11)

## 2023-06-19 PROCEDURE — 83036 HEMOGLOBIN GLYCOSYLATED A1C: CPT | Performed by: FAMILY MEDICINE

## 2023-06-19 PROCEDURE — 84439 ASSAY OF FREE THYROXINE: CPT | Performed by: FAMILY MEDICINE

## 2023-06-19 PROCEDURE — 80050 GENERAL HEALTH PANEL: CPT | Performed by: FAMILY MEDICINE

## 2023-06-19 PROCEDURE — 80061 LIPID PANEL: CPT | Performed by: FAMILY MEDICINE

## 2023-06-30 ENCOUNTER — OFFICE VISIT (OUTPATIENT)
Dept: FAMILY MEDICINE CLINIC | Facility: CLINIC | Age: 58
End: 2023-06-30
Payer: COMMERCIAL

## 2023-06-30 VITALS
WEIGHT: 178 LBS | DIASTOLIC BLOOD PRESSURE: 64 MMHG | TEMPERATURE: 98 F | HEART RATE: 56 BPM | BODY MASS INDEX: 26.98 KG/M2 | HEIGHT: 68 IN | SYSTOLIC BLOOD PRESSURE: 100 MMHG

## 2023-06-30 DIAGNOSIS — R73.03 PREDIABETES: ICD-10-CM

## 2023-06-30 DIAGNOSIS — Z51.81 THERAPEUTIC DRUG MONITORING: Primary | ICD-10-CM

## 2023-06-30 DIAGNOSIS — Z79.899 ENCOUNTER FOR LONG-TERM CURRENT USE OF MEDICATION: ICD-10-CM

## 2023-06-30 DIAGNOSIS — E78.00 HYPERCHOLESTEROLEMIA: ICD-10-CM

## 2023-06-30 DIAGNOSIS — F41.1 GAD (GENERALIZED ANXIETY DISORDER): ICD-10-CM

## 2023-06-30 PROCEDURE — 3074F SYST BP LT 130 MM HG: CPT | Performed by: FAMILY MEDICINE

## 2023-06-30 PROCEDURE — 99214 OFFICE O/P EST MOD 30 MIN: CPT | Performed by: FAMILY MEDICINE

## 2023-06-30 PROCEDURE — 3008F BODY MASS INDEX DOCD: CPT | Performed by: FAMILY MEDICINE

## 2023-06-30 PROCEDURE — 3078F DIAST BP <80 MM HG: CPT | Performed by: FAMILY MEDICINE

## 2023-06-30 RX ORDER — ESCITALOPRAM OXALATE 10 MG/1
10 TABLET ORAL DAILY
Qty: 90 TABLET | Refills: 0 | Status: SHIPPED | OUTPATIENT
Start: 2023-06-30

## 2023-06-30 RX ORDER — ESCITALOPRAM OXALATE 5 MG/1
5 TABLET ORAL DAILY
Qty: 30 TABLET | Refills: 1 | Status: CANCELLED | OUTPATIENT
Start: 2023-06-30

## 2023-09-29 ENCOUNTER — OFFICE VISIT (OUTPATIENT)
Dept: FAMILY MEDICINE CLINIC | Facility: CLINIC | Age: 58
End: 2023-09-29
Payer: COMMERCIAL

## 2023-09-29 DIAGNOSIS — Z78.0 POSTMENOPAUSAL: ICD-10-CM

## 2023-09-29 DIAGNOSIS — Z23 NEED FOR VACCINATION: ICD-10-CM

## 2023-09-29 DIAGNOSIS — Z51.81 THERAPEUTIC DRUG MONITORING: Primary | ICD-10-CM

## 2023-09-29 DIAGNOSIS — F41.1 GAD (GENERALIZED ANXIETY DISORDER): ICD-10-CM

## 2023-09-29 DIAGNOSIS — Z79.899 ENCOUNTER FOR LONG-TERM CURRENT USE OF MEDICATION: ICD-10-CM

## 2023-09-29 PROCEDURE — 90471 IMMUNIZATION ADMIN: CPT | Performed by: FAMILY MEDICINE

## 2023-09-29 PROCEDURE — 90750 HZV VACC RECOMBINANT IM: CPT | Performed by: FAMILY MEDICINE

## 2023-09-29 PROCEDURE — 99214 OFFICE O/P EST MOD 30 MIN: CPT | Performed by: FAMILY MEDICINE

## 2023-09-29 RX ORDER — ESCITALOPRAM OXALATE 10 MG/1
10 TABLET ORAL DAILY
Qty: 90 TABLET | Refills: 1 | Status: SHIPPED | OUTPATIENT
Start: 2023-09-29

## 2023-10-13 ENCOUNTER — HOSPITAL ENCOUNTER (OUTPATIENT)
Dept: BONE DENSITY | Age: 58
Discharge: HOME | End: 2023-10-13
Attending: FAMILY MEDICINE
Payer: COMMERCIAL

## 2023-10-13 DIAGNOSIS — Z78.0 POSTMENOPAUSAL: ICD-10-CM

## 2023-10-13 PROCEDURE — 77080 DXA BONE DENSITY AXIAL: CPT | Performed by: FAMILY MEDICINE

## 2023-10-26 ENCOUNTER — OFFICE VISIT (OUTPATIENT)
Dept: FAMILY MEDICINE CLINIC | Facility: CLINIC | Age: 58
End: 2023-10-26

## 2023-10-26 VITALS
TEMPERATURE: 97 F | WEIGHT: 181.81 LBS | HEIGHT: 70 IN | DIASTOLIC BLOOD PRESSURE: 60 MMHG | HEART RATE: 70 BPM | OXYGEN SATURATION: 95 % | SYSTOLIC BLOOD PRESSURE: 108 MMHG | BODY MASS INDEX: 26.03 KG/M2

## 2023-10-26 DIAGNOSIS — M85.89 OSTEOPENIA OF MULTIPLE SITES: ICD-10-CM

## 2023-10-26 DIAGNOSIS — M81.0 OSTEOPOROSIS OF LUMBAR SPINE: Primary | ICD-10-CM

## 2023-10-26 PROCEDURE — 3078F DIAST BP <80 MM HG: CPT | Performed by: FAMILY MEDICINE

## 2023-10-26 PROCEDURE — 99213 OFFICE O/P EST LOW 20 MIN: CPT | Performed by: FAMILY MEDICINE

## 2023-10-26 PROCEDURE — 3008F BODY MASS INDEX DOCD: CPT | Performed by: FAMILY MEDICINE

## 2023-10-26 PROCEDURE — 3074F SYST BP LT 130 MM HG: CPT | Performed by: FAMILY MEDICINE

## 2023-10-28 PROBLEM — M85.89 OSTEOPENIA OF MULTIPLE SITES: Status: ACTIVE | Noted: 2023-10-28

## 2023-10-28 PROBLEM — M81.0 OSTEOPOROSIS OF LUMBAR SPINE: Status: ACTIVE | Noted: 2023-10-28

## 2024-03-04 ENCOUNTER — OFFICE VISIT (OUTPATIENT)
Dept: FAMILY MEDICINE CLINIC | Facility: CLINIC | Age: 59
End: 2024-03-04
Payer: COMMERCIAL

## 2024-03-04 VITALS
OXYGEN SATURATION: 98 % | HEIGHT: 70 IN | RESPIRATION RATE: 16 BRPM | TEMPERATURE: 97 F | WEIGHT: 188 LBS | DIASTOLIC BLOOD PRESSURE: 74 MMHG | HEART RATE: 73 BPM | BODY MASS INDEX: 26.92 KG/M2 | SYSTOLIC BLOOD PRESSURE: 112 MMHG

## 2024-03-04 DIAGNOSIS — F41.1 GAD (GENERALIZED ANXIETY DISORDER): ICD-10-CM

## 2024-03-04 DIAGNOSIS — Z23 NEED FOR VACCINATION: ICD-10-CM

## 2024-03-04 DIAGNOSIS — Z12.31 ENCOUNTER FOR SCREENING MAMMOGRAM FOR MALIGNANT NEOPLASM OF BREAST: ICD-10-CM

## 2024-03-04 DIAGNOSIS — Z79.899 ENCOUNTER FOR LONG-TERM CURRENT USE OF MEDICATION: ICD-10-CM

## 2024-03-04 DIAGNOSIS — Z51.81 THERAPEUTIC DRUG MONITORING: Primary | ICD-10-CM

## 2024-03-04 DIAGNOSIS — M25.561 RIGHT MEDIAL KNEE PAIN: ICD-10-CM

## 2024-03-04 PROCEDURE — 3008F BODY MASS INDEX DOCD: CPT | Performed by: FAMILY MEDICINE

## 2024-03-04 PROCEDURE — 3074F SYST BP LT 130 MM HG: CPT | Performed by: FAMILY MEDICINE

## 2024-03-04 PROCEDURE — 3078F DIAST BP <80 MM HG: CPT | Performed by: FAMILY MEDICINE

## 2024-03-04 PROCEDURE — 90471 IMMUNIZATION ADMIN: CPT | Performed by: FAMILY MEDICINE

## 2024-03-04 PROCEDURE — 99214 OFFICE O/P EST MOD 30 MIN: CPT | Performed by: FAMILY MEDICINE

## 2024-03-04 PROCEDURE — 90750 HZV VACC RECOMBINANT IM: CPT | Performed by: FAMILY MEDICINE

## 2024-03-04 RX ORDER — PHENOL 1.4 %
600 AEROSOL, SPRAY (ML) MUCOUS MEMBRANE
COMMUNITY

## 2024-03-04 RX ORDER — ESCITALOPRAM OXALATE 5 MG/1
5 TABLET ORAL DAILY
Qty: 90 TABLET | Refills: 0 | Status: SHIPPED | OUTPATIENT
Start: 2024-03-04

## 2024-03-04 NOTE — PROGRESS NOTES
Alba Babin is a 58 year old female.     Chief Complaint   Patient presents with    Other     Therapeutic drug monitoring and shingles vaccine    Pain     R knee issues    Anxiety         HPI:         Anxiety:  Patient feels she is doing very well on generic Lexapro 10 mg daily.  At last visit we did discuss possibly decreasing and possibly weaning off the generic Lexapro spring 2024.  Patient states that she thinks she is okay to try going down on the dose.  No adverse effects to the escitalopram noticed.        Right knee injury:  2 to 3 months ago.  Patient went to see Dr. Kiko Fisher.  Had cortisone injection of right knee 12/27/2023 with Dr. Fisher.  Pain relieved with the cortisone injection.  However, pain returned about 2 weeks ago.  Pain worse as the day goes on.  The pain is an achy pain.  Patient notes the pain returned after walking about 4 miles when she was on vacation.  The pain does not radiate.  Denies swelling of the knee.        Wt Readings from Last 6 Encounters:   03/04/24 188 lb (85.3 kg)   10/26/23 181 lb 12.8 oz (82.5 kg)   09/29/23 (P) 182 lb 6.4 oz (82.7 kg)   06/30/23 178 lb (80.7 kg)   05/26/23 178 lb (80.7 kg)   03/23/22 186 lb (84.4 kg)      Body mass index is 26.98 kg/m².        Current Outpatient Medications   Medication Sig Dispense Refill    Calcium Carbonate 600 MG Oral Tab Take 1 tablet (600 mg total) by mouth.      escitalopram 5 MG Oral Tab Take 1 tablet (5 mg total) by mouth daily. 90 tablet 0    Soy Protein-Soy Isoflavone (SOY CARE MENOPAUSE OR)       Flaxseed, Linseed, (FLAXSEED OIL MAX STR OR)       Collagen-Vitamin C-Biotin (COLLAGEN 1500/C OR)       Eletriptan Hydrobromide 40 MG Oral Tab Take 1 tablet (40 mg total) by mouth as needed. Daily for migraines. Not to exceed two tablets in 24 hours 9 tablet 2    Ascorbic Acid (VITAMIN C) 100 MG Oral Tab Take 1 tablet (100 mg total) by mouth daily.      Zinc 10 MG Mouth/Throat Lozenge Use as directed in the mouth or throat.       fluticasone propionate 50 MCG/ACT Nasal Suspension Use 2 sprays to each nostril once daily after shower for nasal/sinus congestion 1 each 1    Cyanocobalamin (B-12) 100 MCG Oral Tab Take 100 mcg by mouth daily.      Cholecalciferol (VITAMIN D) 125 MCG (5000 UT) Oral Cap Take 1 capsule (5,000 Units total) by mouth at bedtime.      Multiple Vitamin (MULTI-VITAMIN) Oral Tab Take 1 tablet by mouth daily.      Omega-3 Fatty Acids (FISH OIL) 500 MG Oral Cap Take by mouth.      Probiotic Product (PROBIOTIC-10 OR) Take by mouth twice a week.      Calcium-Magnesium 100-50 MG Oral Tab Take by mouth at bedtime.      Cetirizine HCl (ZYRTEC ALLERGY OR) Take by mouth as needed.          Past Medical History:   Diagnosis Date    Allergic rhinitis     Anemia     Hx of migraines     Injection (erythema) 2023    Dr.Michael Fisher    Iron deficiency 2020    Overweight with body mass index (BMI) of 28 to 28.9 in adult 2022    Seasonal allergies       Past Surgical History:   Procedure Laterality Date    CATARACT  2016    D & C            TONSILLECTOMY        Social History:    Social History     Socioeconomic History    Marital status:    Tobacco Use    Smoking status: Former     Packs/day: 0.00     Years: 0.00     Additional pack years: 0.00     Total pack years: 0.00     Types: Cigarettes     Quit date:      Years since quittin.1    Smokeless tobacco: Never   Vaping Use    Vaping Use: Never used   Substance and Sexual Activity    Alcohol use: Yes     Alcohol/week: 2.0 standard drinks of alcohol     Types: 2 Glasses of wine per week     Comment: socially    Drug use: No    Sexual activity: Yes     Partners: Male     Birth control/protection: Condom   Other Topics Concern    Caffeine Concern Yes     Comment: 1  a day    Exercise Yes     Comment: Walking    Seat Belt Yes    Special Diet No    Stress Concern No    Weight Concern No    Blood Transfusions No    Sleep Concern No         Family  History   Problem Relation Age of Onset    Hypertension Father     Hypertension Mother     Diabetes Mother     Other (alzheimer's) Mother     Dementia Mother     Cancer Maternal Grandmother         esophageal    Dementia Maternal Grandfather     Stroke Paternal Grandmother     Other (Other) Paternal Grandfather         emphysema    No Known Problems Daughter     No Known Problems Son     Glaucoma Brother     Hypertension Brother      REVIEW OF SYSTEMS:   GENERAL HEALTH: Overall feels well.    SKIN: No complaints any unusual skin lesions or rashes   RESPIRATORY: No complaints AVIS/KOHLI  CARDIOVASCULAR: No complaints of CP/palpitations  Musculoskeletal: As in HPI  GI: No complaints of abdominal pain/nausea/vomiting/constipation/diarrhea  NEURO: denies numbness or tingling of lower extremities  PSYCH: denies SI/HI    Immunization History   Administered Date(s) Administered    Covid-19 Vaccine Moderna 100 mcg/0.5 ml 03/15/2021, 04/12/2021    FLULAVAL 6 months & older 0.5 ml Prefilled syringe (73954) 10/21/2020    Fluvirin, 3 Years & >, Im 10/01/2017    HEP B 02/21/2017, 04/09/2017, 10/03/2017    Influenza 11/08/2023    TDAP 03/19/2018    Zoster Vaccine Recombinant Adjuvanted (Shingrix) 09/29/2023, 03/04/2024       EXAM:   /74   Pulse 73   Temp 97.1 °F (36.2 °C) (Temporal)   Resp 16   Ht 5' 10\" (1.778 m)   Wt 188 lb (85.3 kg)   SpO2 98%   BMI 26.98 kg/m²   GENERAL: NAD, pleasant well-appearing  female  SKIN: no visible rashes  HEAD: NCAT  VASCULAR: No lower extremity edema  Musculoskeletal: Right knee: Medial joint  to palpation.  Right knee without effusion and not edematous, no increased warmth.  No reproduction of pain with combining forces of compression with rotation or distraction with rotation.  Left knee: Nontender to palpation.  Passive range of motion testing normal.  EXTREMITIES: no cyanosis or clubbing  NEURO: Alert and Oriented x3.  No gross motor or gross sensory  abnormalities.  PSYCH: Affect normal.  Normal thought content.        DATA:      XR KNEE, COMPLETE (4 OR MORE VIEWS), RIGHT (CPT=73564)    Anatomical Region Laterality Modality   Knee right Computed Radiography     Impression    IMPRESSION:  Mild degenerative change of the medial compartment.  Narrative    DATE OF SERVICE: 12.27.2023  XR KNEE, COMPLETE (4 OR MORE VIEWS), RIGHT (CPT=73564)  -  12/27/2023 3:15 PM    Indication:  Right knee pain, unspecified chronicity    Comparison:   None    Technique: Knee, 4 views      Findings:    Osseous structures are intact, without evidence of acute fracture or dislocation.    There is mild narrowing of the medial tibiofemoral joint space and minimal spurring. Findings  indicate mild degenerative change.    There is no appreciable joint effusion.  Procedure Note    Christopher Vargas MD - 12/28/2023  Formatting of this note might be different from the original.  DATE OF SERVICE: 12.27.2023  XR KNEE, COMPLETE (4 OR MORE VIEWS), RIGHT (CPT=73564)  -  12/27/2023 3:15 PM    Indication:  Right knee pain, unspecified chronicity    Comparison:   None    Technique: Knee, 4 views      Findings:    Osseous structures are intact, without evidence of acute fracture or dislocation.    There is mild narrowing of the medial tibiofemoral joint space and minimal spurring. Findings  indicate mild degenerative change.    There is no appreciable joint effusion.      =====  IMPRESSION:  Mild degenerative change of the medial compartment.  Exam End: 12/27/23  2:55 PM    Specimen Collected: 12/28/23  3:44 PM Last Resulted: 12/28/23  3:44 PM   Received From: [facility]          Progress note by Dr. Fisher in care everywhere dated 12/27/2023 read and reviewed.      ASSESSMENT AND PLAN:       Encounter Diagnoses   Name Primary?    Therapeutic drug monitoring Yes    PAT (generalized anxiety disorder)     Encounter for long-term current use of medication     Right medial knee pain     Need for  vaccination     Encounter for screening mammogram for malignant neoplasm of breast          1. Therapeutic drug monitoring  2. PAT (generalized anxiety disorder)  Stable and doing very well on escitalopram 10 mg daily.  Recommend trial of decreasing to 5 mg daily, patient is agreeable.  We discussed if she needs to go back up to the 10 mg prior to follow-up appointment she should do so.  Also, she could try after 1 month of being on the 5 mg tab discontinuing altogether.  Follow-up in 2 months.    - escitalopram 5 MG Oral Tab; Take 1 tablet (5 mg total) by mouth daily.  Dispense: 90 tablet; Refill: 0    3. Encounter for long-term current use of medication  Medication use, risks, benefits, side effects and precautions discussed, patient verbalizes understanding. Questions encouraged and answered to patient's satisfaction.    - escitalopram 5 MG Oral Tab; Take 1 tablet (5 mg total) by mouth daily.  Dispense: 90 tablet; Refill: 0    4. Right medial knee pain  Patient received cortisone injection by Dr. Kiko Fisher 12/27/2023 with relief of pain for 2 months.  Pain has returned.  Patient referred to Dr. Price for further evaluation and care.    - Ortho Referral - In Network    5. Need for vaccination  - Shingrix (Shingles recombinant) Immunization (50 and older)    6. Encounter for screening mammogram for malignant neoplasm of breast  Patient due for mammogram on or shortly after 3/23/2024.  Patient was recommended to switch to getting her mammograms through [facility].  She was also recommended to obtain her previous films on disc and bring with her to her mammogram appointment to provide to the radiology department.    - [facility] MARY ANN 2D+3D SCREENING BILAT (CPT=77067/98075); Future          Orders Placed This Encounter   Procedures    Shingrix (Shingles recombinant) Immunization (50 and older)       Meds & Refills for this Visit:  Requested Prescriptions     Signed Prescriptions Disp Refills    escitalopram 5 MG Oral Tab 90  tablet 0     Sig: Take 1 tablet (5 mg total) by mouth daily.       Imaging & Consults:  ZOSTER VACC RECOMBINANT IM NJX  ORTHOPEDIC - INTERNAL  ELISA MARY ANN 2D+3D SCREENING BILAT (CPT=77067/78707)    Return in about 2 months (around 5/4/2024) for Anxiety and annual wellness visit.

## 2024-06-02 DIAGNOSIS — G43.909 MIGRAINE WITHOUT STATUS MIGRAINOSUS, NOT INTRACTABLE, UNSPECIFIED MIGRAINE TYPE: ICD-10-CM

## 2024-06-03 RX ORDER — ELETRIPTAN HYDROBROMIDE 40 MG/1
40 TABLET, FILM COATED ORAL AS NEEDED
Qty: 9 TABLET | Refills: 2 | Status: SHIPPED | OUTPATIENT
Start: 2024-06-03

## 2024-07-26 ENCOUNTER — OFFICE VISIT (OUTPATIENT)
Dept: FAMILY MEDICINE CLINIC | Facility: CLINIC | Age: 59
End: 2024-07-26
Payer: COMMERCIAL

## 2024-07-26 VITALS
DIASTOLIC BLOOD PRESSURE: 58 MMHG | WEIGHT: 194 LBS | TEMPERATURE: 98 F | HEART RATE: 80 BPM | BODY MASS INDEX: 29.07 KG/M2 | HEIGHT: 68.39 IN | SYSTOLIC BLOOD PRESSURE: 114 MMHG | RESPIRATION RATE: 19 BRPM | OXYGEN SATURATION: 96 %

## 2024-07-26 DIAGNOSIS — E66.3 OVERWEIGHT WITH BODY MASS INDEX (BMI) OF 29 TO 29.9 IN ADULT: ICD-10-CM

## 2024-07-26 DIAGNOSIS — E78.00 HYPERCHOLESTEROLEMIA: ICD-10-CM

## 2024-07-26 DIAGNOSIS — Z00.00 ROUTINE GENERAL MEDICAL EXAMINATION AT A HEALTH CARE FACILITY: Primary | ICD-10-CM

## 2024-07-26 DIAGNOSIS — R53.83 FATIGUE, UNSPECIFIED TYPE: ICD-10-CM

## 2024-07-26 DIAGNOSIS — Z86.39 HISTORY OF IRON DEFICIENCY: ICD-10-CM

## 2024-07-26 DIAGNOSIS — R68.82 DECREASED LIBIDO: ICD-10-CM

## 2024-07-26 DIAGNOSIS — G43.909 MIGRAINE WITHOUT STATUS MIGRAINOSUS, NOT INTRACTABLE, UNSPECIFIED MIGRAINE TYPE: ICD-10-CM

## 2024-07-26 DIAGNOSIS — R73.03 PREDIABETES: ICD-10-CM

## 2024-07-26 DIAGNOSIS — Z51.81 THERAPEUTIC DRUG MONITORING: ICD-10-CM

## 2024-07-26 DIAGNOSIS — Z79.899 ENCOUNTER FOR LONG-TERM CURRENT USE OF MEDICATION: ICD-10-CM

## 2024-07-26 DIAGNOSIS — F41.1 GAD (GENERALIZED ANXIETY DISORDER): ICD-10-CM

## 2024-07-26 PROBLEM — M25.661 STIFFNESS OF RIGHT KNEE: Status: ACTIVE | Noted: 2024-05-21

## 2024-07-26 PROBLEM — M62.81 MUSCLE WEAKNESS: Status: ACTIVE | Noted: 2024-05-21

## 2024-07-26 PROBLEM — M25.561 ARTHRALGIA OF RIGHT KNEE: Status: ACTIVE | Noted: 2024-05-21

## 2024-07-26 RX ORDER — ESCITALOPRAM OXALATE 5 MG/1
5 TABLET ORAL DAILY
Qty: 90 TABLET | Refills: 1 | Status: SHIPPED | OUTPATIENT
Start: 2024-07-26

## 2024-07-26 NOTE — PATIENT INSTRUCTIONS
-If your libido and fatigue do not improve/resolve, recommend schedule a sooner follow-up appointment.

## 2024-07-26 NOTE — PROGRESS NOTES
Chief Complaint   Patient presents with    Wellness Visit    Anxiety    Fatigue    Migraine         HPI:   Alba Babin is a 59 year old female     Anxiety:  Side effects of 10 mg generic lexapro which she is still taking the 10 mg, did not switch to the 5 mg, decreased libido/sex drive and more difficult to orgasm.      Symptoms: denies discharge, itching, burning or dysuria.     Last PAP: Up-to-date      Last colonoscopy: Up-to-date  Last mammogram: Up-to-date      Wt Readings from Last 6 Encounters:   07/26/24 194 lb (88 kg)   03/04/24 188 lb (85.3 kg)   10/26/23 181 lb 12.8 oz (82.5 kg)   09/29/23 (P) 182 lb 6.4 oz (82.7 kg)   06/30/23 178 lb (80.7 kg)   05/26/23 178 lb (80.7 kg)     Body mass index is 29.17 kg/m².       Patient Active Problem List   Diagnosis    Migraine without status migrainosus, not intractable    Iron deficiency    Overweight with body mass index (BMI) of 29 to 29.9 in adult    Hypercholesterolemia    PAT (generalized anxiety disorder)    Encounter for long-term current use of medication    Therapeutic drug monitoring    Prediabetes    Osteoporosis of lumbar spine    Osteopenia of multiple sites    Right medial knee pain    Fatigue    Arthralgia of right knee    Muscle weakness    Stiffness of right knee    History of iron deficiency    Decreased libido     Current Outpatient Medications   Medication Sig Dispense Refill    escitalopram 5 MG Oral Tab Take 1 tablet (5 mg total) by mouth daily. 90 tablet 1    Eletriptan Hydrobromide 40 MG Oral Tab Take 1 tablet (40 mg total) by mouth as needed. Daily for migraines. Not to exceed two tablets in 24 hours 9 tablet 2    Calcium Carbonate 600 MG Oral Tab Take 1 tablet (600 mg total) by mouth.      Soy Protein-Soy Isoflavone (SOY CARE MENOPAUSE OR)       Flaxseed, Linseed, (FLAXSEED OIL MAX STR OR)       Collagen-Vitamin C-Biotin (COLLAGEN 1500/C OR)       Ascorbic Acid (VITAMIN C) 100 MG Oral Tab Take 1 tablet (100 mg total) by mouth daily.       Zinc 10 MG Mouth/Throat Lozenge Use as directed in the mouth or throat.      fluticasone propionate 50 MCG/ACT Nasal Suspension Use 2 sprays to each nostril once daily after shower for nasal/sinus congestion 1 each 1    Cyanocobalamin (B-12) 100 MCG Oral Tab Take 100 mcg by mouth daily.      Cholecalciferol (VITAMIN D) 125 MCG (5000 UT) Oral Cap Take 1 capsule (5,000 Units total) by mouth at bedtime.      Multiple Vitamin (MULTI-VITAMIN) Oral Tab Take 1 tablet by mouth daily.      Omega-3 Fatty Acids (FISH OIL) 500 MG Oral Cap Take by mouth.      Probiotic Product (PROBIOTIC-10 OR) Take by mouth twice a week.      Calcium-Magnesium 100-50 MG Oral Tab Take by mouth at bedtime.      Cetirizine HCl (ZYRTEC ALLERGY OR) Take by mouth as needed.          Past Medical History:    Allergic rhinitis    Anemia    Hx of migraines    Injection (erythema)    Dr.Michael Fisher    Iron deficiency    Overweight with body mass index (BMI) of 28 to 28.9 in adult    Seasonal allergies      Past Surgical History:   Procedure Laterality Date    Cataract      D & c            Tonsillectomy        Family History   Problem Relation Age of Onset    Hypertension Father     Hypertension Mother     Diabetes Mother     Other (alzheimer's) Mother     Dementia Mother     Cancer Maternal Grandmother         esophageal    Dementia Maternal Grandfather     Stroke Paternal Grandmother     Other (Other) Paternal Grandfather         emphysema    No Known Problems Daughter     No Known Problems Son     Glaucoma Brother     Hypertension Brother       Social History:   Social History     Socioeconomic History    Marital status:    Tobacco Use    Smoking status: Former     Current packs/day: 0.00     Types: Cigarettes     Quit date:      Years since quittin.5    Smokeless tobacco: Never   Vaping Use    Vaping status: Never Used   Substance and Sexual Activity    Alcohol use: Yes     Alcohol/week: 2.0 standard drinks of alcohol      Types: 2 Glasses of wine per week     Comment: socially    Drug use: No    Sexual activity: Yes     Partners: Male     Birth control/protection: Condom   Other Topics Concern    Caffeine Concern Yes     Comment: 1  a day    Exercise Yes     Comment: Walking    Seat Belt Yes    Special Diet No    Stress Concern No    Weight Concern No    Blood Transfusions No    Sleep Concern No     Social Determinants of Health     Physical Activity: Insufficiently Active (2/7/2020)    Received from [facility], [facility]    Exercise Vital Sign     Days of Exercise per Week: 2 days     Minutes of Exercise per Session: 30 min     Occ: [employer] as . Marital Status: . Children: 2.   Exercise: none right now, was walking in the past.  Diet: tries to watch her diet, trying to eat less \"junk\", eating more more salads     REVIEW OF SYSTEMS:   GENERAL: Overall feels well  SKIN: denies any unusual skin lesions or rashes  EYES: denies vision changes  HEENT: denies upper respiratory symptoms  LUNGS: denies cough or shortness of breath with exertion  CHEST:  denies breast changes or pain  CARDIOVASCULAR: denies chest pain or tightness on exertion  VASCULAR: No lower extremity swelling  GI: denies abdominal pain, bowel movement changes, blood in stool  : No complaint of urinary problems, vaginal discharge or discomfort  MUSCULOSKELETAL: denies joint pain   NEURO: denies headaches or dizziness  PSYCH: denies depression or anxiety  ENDOCRINE: No complaints of temperature intolerance, polyuria, or excessive sweating.  LYMPHATICS: No complaints of swollen glands      EXAM:   /58   Pulse 80   Temp 97.7 °F (36.5 °C) (Temporal)   Resp 19   Ht 5' 8.39\" (1.737 m)   Wt 194 lb (88 kg)   SpO2 96%   BMI 29.17 kg/m²   Body mass index is 29.17 kg/m².   GENERAL: NAD, Pleasant  female.  SKIN: No visible rashes or suspicious lesions.  HEENT: atraumatic, normocephalic,  EACs and TMs clear normal bilaterally.  Nose: No nasal discharge.  OP: MMM.  Posteriorly no exudate or erythema.  EYES: PERRL, EOMI, sclera, conjunctiva are clear  NECK: supple, no adenopathy/thyromegaly/masses  CHEST: no chest tenderness  BREAST: Symmetric. No suspicious mass, no peau d'orange, no nipple retraction or nipple discharge.  No tenderness to palpation.  LUNGS: Clear to auscultation bilateral, no rales, rhonchi or wheezing  CARDIO: RRR without murmur normal S1S2  ABD:  Soft, non tender to palpation.  No masses, HSM, or pulsations appreciated.  MUSCULOSKELETAL: gait normal, no gross M/S defect.  EXTREMITIES: no clubbing, cyanosis, or edema  NEURO: Alert and oriented x3.  No gross motor or sensory deficit.  PSYCH: normal affect      Immunization History   Administered Date(s) Administered    Covid-19 Vaccine Moderna 100 mcg/0.5 ml 03/15/2021, 04/12/2021    FLULAVAL 6 months & older 0.5 ml Prefilled syringe (33401) 10/21/2020    Fluvirin, 3 Years & >, Im 10/01/2017    HEP B 02/21/2017, 04/09/2017, 10/03/2017    Influenza 11/08/2023    TDAP 03/19/2018    Zoster Vaccine Recombinant Adjuvanted (Shingrix) 09/29/2023, 03/04/2024       DATA:    Diabetes:    Lab Results   Component Value Date    A1C 5.8 (H) 06/19/2023     06/19/2023     Lab Results   Component Value Date    CHOLEST 195 06/19/2023    CHOLEST 219 (H) 04/15/2022    CHOLEST 179 10/24/2020     Lab Results   Component Value Date    HDL 52 06/19/2023    HDL 46 04/15/2022    HDL 46 10/24/2020     Lab Results   Component Value Date     (H) 06/19/2023     (H) 04/15/2022     (H) 10/24/2020     Lab Results   Component Value Date    TRIG 120 06/19/2023    TRIG 125 04/15/2022    TRIG 135 10/24/2020     Lab Results   Component Value Date    AST 28 06/19/2023    AST 34 04/15/2022    AST 21 10/24/2020     Lab Results   Component Value Date    ALT 32 06/19/2023    ALT 51 04/15/2022    ALT 31 10/24/2020           ASSESSMENT AND PLAN:    Alba Babin is a 59 year old female who presents for a complete physical exam.        Encounter Diagnoses   Name Primary?    Routine general medical examination at a health care facility Yes    Therapeutic drug monitoring     PAT (generalized anxiety disorder)     Encounter for long-term current use of medication     Migraine without status migrainosus, not intractable, unspecified migraine type     Fatigue, unspecified type     History of iron deficiency     Decreased libido     Prediabetes     Hypercholesterolemia     Overweight with body mass index (BMI) of 29 to 29.9 in adult          1. Routine general medical examination at a health care facility  Patient provided handout on women's health and prevention.   Routine health profile labs pending.  Age-appropriate calcium and vitamin D intake discussed.    - CBC With Differential With Platelet; Future  - Comp Metabolic Panel (14); Future  - Lipid Panel; Future  - Assay, Thyroid Stim Hormone; Future  - Free T4, (Free Thyroxine); Future      2. Therapeutic drug monitoring  3. PAT (generalized anxiety disorder)  At last office visit March 2024 patient was recommended to do a trial of decreasing Lexapro to 5 mg daily from 10 mg daily, patient actually after that office visit decided to stay at the 10 mg dose.  Patient now would like to try decreasing the dose of the Lexapro to see if this helps improve decreased libido and difficulty with orgasm.  Prescription sent for generic Lexapro 5 mg daily.  Follow-up in 6 months, sooner if needed.    - escitalopram 5 MG Oral Tab; Take 1 tablet (5 mg total) by mouth daily.  Dispense: 90 tablet; Refill: 1    4. Encounter for long-term current use of medication  Medication use, risks, benefits, side effects and precautions discussed, patient verbalizes understanding. Questions encouraged and answered to patient's satisfaction.    - escitalopram 5 MG Oral Tab; Take 1 tablet (5 mg total) by mouth daily.  Dispense: 90 tablet;  Refill: 1    5. Migraine without status migrainosus, not intractable, unspecified migraine type  Infrequently having migraines.  Uses Relpax with good relief.  Patient to avoid any known triggers as able.  Maintain good hydration, avoid dehydration.  Recommend good sleep hygiene.  Relpax was refilled in June which was between patient's office visits.    6. Fatigue, unspecified type  Etiology uncertain.  Likely multifactorial.  Patient does have a history of iron deficiency, we will evaluate iron studies.    - Ferritin [E]; Future  - Iron And Tibc [E]; Future    7. History of iron deficiency  Patient does have a history of iron deficiency, we will evaluate iron studies.    - Ferritin [E]; Future  - Iron And Tibc [E]; Future    8. Decreased libido  Associated with difficulty attaining orgasm, this may be multifactorial including adverse effect of the SSRI/Lexapro.  We will trial a decrease of the Lexapro to 5 mg daily from 10 mg daily.  If not improving, patient to call or RTC.    9. Prediabetes  Reevaluate, further recommendations pending results.    - Hemoglobin A1C [E]; Future    10. Hypercholesterolemia  Mildly elevated LDL.  Reevaluate lipid panel, further recommendations pending results.    - Lipid Panel; Future    11. Overweight with body mass index (BMI) of 29 to 29.9 in adult  Recommend healthy diet including green leafy vegetables, fresh fruits and lean protein.  Aerobic exercise for total of 150 minutes/week is recommended for cardiovascular fitness, and 45-60 minutes 6-7 days a week to help obtain weight loss.             Orders Placed This Encounter   Procedures    Hemoglobin A1C [E]    CBC With Differential With Platelet    Comp Metabolic Panel (14)    Lipid Panel    Assay, Thyroid Stim Hormone    Free T4, (Free Thyroxine)    Ferritin [E]    Iron And Tibc [E]       Meds & Refills for this Visit:  Requested Prescriptions     Signed Prescriptions Disp Refills    escitalopram 5 MG Oral Tab 90 tablet 1      Sig: Take 1 tablet (5 mg total) by mouth daily.       Imaging & Consults:  None  Health Maintenance Due   Topic Date Due    Mammogram  03/22/2024    Annual Physical  05/26/2024     Return in about 6 months (around 1/26/2025) for Anxiety, sooner if needed.

## 2024-08-12 ENCOUNTER — LAB ENCOUNTER (OUTPATIENT)
Dept: LAB | Age: 59
End: 2024-08-12
Attending: FAMILY MEDICINE
Payer: COMMERCIAL

## 2024-08-12 DIAGNOSIS — R73.03 PREDIABETES: ICD-10-CM

## 2024-08-12 DIAGNOSIS — Z00.00 ROUTINE GENERAL MEDICAL EXAMINATION AT A HEALTH CARE FACILITY: ICD-10-CM

## 2024-08-12 DIAGNOSIS — E78.00 HYPERCHOLESTEROLEMIA: ICD-10-CM

## 2024-08-12 DIAGNOSIS — Z86.39 HISTORY OF IRON DEFICIENCY: ICD-10-CM

## 2024-08-12 DIAGNOSIS — R53.83 FATIGUE, UNSPECIFIED TYPE: ICD-10-CM

## 2024-08-12 LAB
ALBUMIN SERPL-MCNC: 4.5 G/DL (ref 3.2–4.8)
ALBUMIN/GLOB SERPL: 1.5 {RATIO} (ref 1–2)
ALP LIVER SERPL-CCNC: 74 U/L
ALT SERPL-CCNC: 22 U/L
ANION GAP SERPL CALC-SCNC: 6 MMOL/L (ref 0–18)
AST SERPL-CCNC: 26 U/L (ref ?–34)
BASOPHILS # BLD AUTO: 0.06 X10(3) UL (ref 0–0.2)
BASOPHILS NFR BLD AUTO: 0.9 %
BILIRUB SERPL-MCNC: 0.6 MG/DL (ref 0.3–1.2)
BUN BLD-MCNC: 14 MG/DL (ref 9–23)
CALCIUM BLD-MCNC: 9.6 MG/DL (ref 8.7–10.4)
CHLORIDE SERPL-SCNC: 105 MMOL/L (ref 98–112)
CHOLEST SERPL-MCNC: 213 MG/DL (ref ?–200)
CO2 SERPL-SCNC: 30 MMOL/L (ref 21–32)
CREAT BLD-MCNC: 0.71 MG/DL
DEPRECATED HBV CORE AB SER IA-ACNC: 72.5 NG/ML
EGFRCR SERPLBLD CKD-EPI 2021: 98 ML/MIN/1.73M2 (ref 60–?)
EOSINOPHIL # BLD AUTO: 0.21 X10(3) UL (ref 0–0.7)
EOSINOPHIL NFR BLD AUTO: 3.2 %
ERYTHROCYTE [DISTWIDTH] IN BLOOD BY AUTOMATED COUNT: 13.2 %
EST. AVERAGE GLUCOSE BLD GHB EST-MCNC: 123 MG/DL (ref 68–126)
FASTING PATIENT LIPID ANSWER: YES
FASTING STATUS PATIENT QL REPORTED: YES
GLOBULIN PLAS-MCNC: 3 G/DL (ref 2–3.5)
GLUCOSE BLD-MCNC: 92 MG/DL (ref 70–99)
HBA1C MFR BLD: 5.9 % (ref ?–5.7)
HCT VFR BLD AUTO: 48.9 %
HDLC SERPL-MCNC: 47 MG/DL (ref 40–59)
HGB BLD-MCNC: 15.2 G/DL
IMM GRANULOCYTES # BLD AUTO: 0.01 X10(3) UL (ref 0–1)
IMM GRANULOCYTES NFR BLD: 0.2 %
IRON SATN MFR SERPL: 35 %
IRON SERPL-MCNC: 109 UG/DL
LDLC SERPL CALC-MCNC: 131 MG/DL (ref ?–100)
LYMPHOCYTES # BLD AUTO: 2.02 X10(3) UL (ref 1–4)
LYMPHOCYTES NFR BLD AUTO: 30.7 %
MCH RBC QN AUTO: 28 PG (ref 26–34)
MCHC RBC AUTO-ENTMCNC: 31.1 G/DL (ref 31–37)
MCV RBC AUTO: 90.2 FL
MONOCYTES # BLD AUTO: 0.65 X10(3) UL (ref 0.1–1)
MONOCYTES NFR BLD AUTO: 9.9 %
NEUTROPHILS # BLD AUTO: 3.62 X10 (3) UL (ref 1.5–7.7)
NEUTROPHILS # BLD AUTO: 3.62 X10(3) UL (ref 1.5–7.7)
NEUTROPHILS NFR BLD AUTO: 55.1 %
NONHDLC SERPL-MCNC: 166 MG/DL (ref ?–130)
OSMOLALITY SERPL CALC.SUM OF ELEC: 292 MOSM/KG (ref 275–295)
PLATELET # BLD AUTO: 259 10(3)UL (ref 150–450)
POTASSIUM SERPL-SCNC: 4.3 MMOL/L (ref 3.5–5.1)
PROT SERPL-MCNC: 7.5 G/DL (ref 5.7–8.2)
RBC # BLD AUTO: 5.42 X10(6)UL
SODIUM SERPL-SCNC: 141 MMOL/L (ref 136–145)
T4 FREE SERPL-MCNC: 1.3 NG/DL (ref 0.8–1.7)
TOTAL IRON BINDING CAPACITY: 315 UG/DL (ref 250–425)
TRANSFERRIN SERPL-MCNC: 225 MG/DL (ref 250–380)
TRIGL SERPL-MCNC: 197 MG/DL (ref 30–149)
TSI SER-ACNC: 1.23 MIU/ML (ref 0.55–4.78)
VLDLC SERPL CALC-MCNC: 36 MG/DL (ref 0–30)
WBC # BLD AUTO: 6.6 X10(3) UL (ref 4–11)

## 2024-08-12 PROCEDURE — 83540 ASSAY OF IRON: CPT | Performed by: FAMILY MEDICINE

## 2024-08-12 PROCEDURE — 83550 IRON BINDING TEST: CPT | Performed by: FAMILY MEDICINE

## 2024-08-12 PROCEDURE — 84439 ASSAY OF FREE THYROXINE: CPT | Performed by: FAMILY MEDICINE

## 2024-08-12 PROCEDURE — 82728 ASSAY OF FERRITIN: CPT | Performed by: FAMILY MEDICINE

## 2024-08-12 PROCEDURE — 83036 HEMOGLOBIN GLYCOSYLATED A1C: CPT | Performed by: FAMILY MEDICINE

## 2024-08-12 PROCEDURE — 80061 LIPID PANEL: CPT | Performed by: FAMILY MEDICINE

## 2024-08-12 PROCEDURE — 80050 GENERAL HEALTH PANEL: CPT | Performed by: FAMILY MEDICINE

## 2024-09-06 ENCOUNTER — TELEMEDICINE (OUTPATIENT)
Dept: FAMILY MEDICINE CLINIC | Facility: CLINIC | Age: 59
End: 2024-09-06
Payer: COMMERCIAL

## 2024-09-06 VITALS — TEMPERATURE: 99 F

## 2024-09-06 DIAGNOSIS — J01.00 ACUTE NON-RECURRENT MAXILLARY SINUSITIS: Primary | ICD-10-CM

## 2024-09-06 RX ORDER — FLUTICASONE PROPIONATE 50 MCG
SPRAY, SUSPENSION (ML) NASAL
Qty: 1 EACH | Refills: 0 | Status: SHIPPED | OUTPATIENT
Start: 2024-09-06

## 2024-09-06 RX ORDER — MELOXICAM 7.5 MG/1
TABLET ORAL
COMMUNITY
Start: 2024-08-13

## 2024-09-06 NOTE — PROGRESS NOTES
The  [employer] Act makes medical notes like these available to patients in the interest of transparency. Please be advised this is a medical document. Medical documents are intended to carry relevant information, facts as evident, and the clinical opinion of the practitioner. The medical note is intended as peer to peer communication and may appear blunt or direct. It is written in medical language and may contain abbreviations or verbiage that are unfamiliar.     This is a telemedicine visit with live, interactive video and audio.     Patient understands and accepts financial responsibility for any deductible, co-insurance and/or co-pays associated with this service.    SUBJECTIVE    This 59-year-old female who is a patient of Dr. Maradiaga, requested a telehealth visit for evaluation of worsening sinus symptoms.  The patient states she started to get ill on 9/3/2024.  She thinks she has been having some low-grade fever at nighttime because she has been having night sweats and chills.  She admits to severe sinus congestion and pressure.  She states her upper teeth hurt.  Her nose is very congested and she is blowing out very thick secretions.  She has a cough.  She admits to postnasal drip.  There is no associated chest pain, shortness of breath, hemoptysis, nausea, vomiting, diarrhea.  She has no past history for asthma.  She has not done a home COVID test.  The patient is using Tylenol severe cold and sinus without any improvement.  She does have a history for seasonal allergies.  She is not currently using any Flonase.  She is taking her Zyrtec.    HISTORY:  Past Medical History:    Allergic rhinitis    Anemia    Anxiety    Hx of migraines    Injection (erythema)    Dr.Michael Fisher    Iron deficiency    Overweight with body mass index (BMI) of 28 to 28.9 in adult    Seasonal allergies      Past Surgical History:   Procedure Laterality Date    Cataract      Colonoscopy      D & c             Tonsillectomy        Family History   Problem Relation Age of Onset    Hypertension Father     Hypertension Mother     Diabetes Mother     Other (alzheimer's) Mother     Dementia Mother     Cancer Maternal Grandmother         esophageal    Dementia Maternal Grandmother     Dementia Maternal Grandfather     Cancer Maternal Grandfather     Stroke Paternal Grandmother     Other (Other) Paternal Grandfather         emphysema    No Known Problems Daughter     No Known Problems Son     Glaucoma Brother     Hypertension Brother       Social History     Socioeconomic History    Marital status:    Tobacco Use    Smoking status: Former     Current packs/day: 0.00     Types: Cigarettes     Quit date:      Years since quittin.6    Smokeless tobacco: Never   Vaping Use    Vaping status: Never Used   Substance and Sexual Activity    Alcohol use: Yes     Alcohol/week: 3.0 standard drinks of alcohol     Types: 3 Standard drinks or equivalent per week     Comment: socially    Drug use: No    Sexual activity: Yes     Partners: Male     Birth control/protection: Condom   Other Topics Concern    Caffeine Concern No    Exercise Yes     Comment: Walking    Seat Belt Yes    Special Diet No    Stress Concern Yes    Weight Concern Yes    Blood Transfusions No    Sleep Concern No     Social Determinants of Health     Physical Activity: Insufficiently Active (2020)    [facility], [facility]    Exercise Vital Sign     Days of Exercise per Week: 2 days     Minutes of Exercise per Session: 30 min        No Known Allergies   Current Outpatient Medications   Medication Sig Dispense Refill    fluticasone propionate 50 MCG/ACT Nasal Suspension 2 sprays to each nostril once daily after shower. 1 each 0    amoxicillin clavulanate 875-125 MG Oral Tab Take 1 tablet by mouth 2 (two) times daily. 20 tablet 0    escitalopram 5 MG Oral Tab Take 1 tablet (5 mg total) by mouth daily. 90 tablet 1     Calcium Carbonate 600 MG Oral Tab Take 1 tablet (600 mg total) by mouth.      Soy Protein-Soy Isoflavone (SOY CARE MENOPAUSE OR)       Flaxseed, Linseed, (FLAXSEED OIL MAX STR OR)       Collagen-Vitamin C-Biotin (COLLAGEN 1500/C OR)       Ascorbic Acid (VITAMIN C) 100 MG Oral Tab Take 1 tablet (100 mg total) by mouth daily.      Zinc 10 MG Mouth/Throat Lozenge Use as directed in the mouth or throat.      Cyanocobalamin (B-12) 100 MCG Oral Tab Take 100 mcg by mouth daily.      Cholecalciferol (VITAMIN D) 125 MCG (5000 UT) Oral Cap Take 1 capsule (5,000 Units total) by mouth at bedtime.      Multiple Vitamin (MULTI-VITAMIN) Oral Tab Take 1 tablet by mouth daily.      Omega-3 Fatty Acids (FISH OIL) 500 MG Oral Cap Take by mouth.      Probiotic Product (PROBIOTIC-10 OR) Take by mouth twice a week.      Calcium-Magnesium 100-50 MG Oral Tab Take by mouth at bedtime.      Cetirizine HCl (ZYRTEC ALLERGY OR) Take by mouth as needed.        Meloxicam 7.5 MG Oral Tab  (Patient not taking: Reported on 9/6/2024)      Eletriptan Hydrobromide 40 MG Oral Tab Take 1 tablet (40 mg total) by mouth as needed. Daily for migraines. Not to exceed two tablets in 24 hours (Patient not taking: Reported on 9/6/2024) 9 tablet 2       OBJECTIVE    The patient is alert and in no respiratory distress.  She is still severely nasally congested.  She has no audible wheezing noted.  Skin color is normal.  Sclera is anicteric.  She appears to be well-hydrated.      ASSESSMENT & PLAN  Diagnoses and all orders for this visit:    Acute non-recurrent maxillary sinusitis    I recommended the patient do a home COVID test.  If the COVID test is positive, she should not start the Augmentin.  If the COVID test is negative, she may take the Augmentin 875 mg 1 tablet twice daily for 10 days.  She should also restart Flonase 2 sprays each nostril once daily after shower.  She should continue with her Zyrtec.  She may continue with her Tylenol sinus.  She should  push fluids and continue with her sinus rinses.  If her COVID test is positive, she was advised she should stay indoors until she has been fever free for 24 hours or until her symptoms have improved.  When she is outdoors, she should wear a mask for 5 days.  She was instructed to go to the emergency room if she is having increased difficulty breathing, chest pain or she is coughing up blood.  She is to follow-up with her PCP if her symptoms or not improving over the next 2 weeks.    -     fluticasone propionate 50 MCG/ACT Nasal Suspension; 2 sprays to each nostril once daily after shower.  -     amoxicillin clavulanate 875-125 MG Oral Tab; Take 1 tablet by mouth 2 (two) times daily.      Eun Spencer,     This dictation was performed with a verbal recognition program (DRAGON) and it was checked for errors. It is possible that there are still dictated errors within this office note. If so, please bring any errors to my attention for an addendum. All efforts were made to ensure that this office note is accurate

## 2024-09-06 NOTE — PATIENT INSTRUCTIONS
Do a home COVID test.  If it is positive, do not start the Augmentin 875 mg one tablet with breakfast and dinner.  If it is negative, you can start the Augmentin.    Use the Flonase 2 sprays each nostril once daily after shower for at least the next 2 weeks or as long as you are congested.    Push fluids. Humidified air. Continue your sinus rinses.     Continue with your Tylenol sinus.    If you test positive for COVID, stay indoors until fever free for 24 hours or until your symptoms improve and wear a mask for 5 days when outdoors.    Go to the ER if you develop any chest pain, increased difficulty breathing or you are coughing up blood.    Follow up with Dr. Maradiaga if your symptoms do not resolve in the next 2 weeks.

## 2024-10-26 LAB
AMB EXT BILIRUBIN, TOTAL: 0.5 MG/DL
AMB EXT BUN: 17 MG/DL
AMB EXT CALCIUM: 9.4
AMB EXT CARBON DIOXIDE: 23
AMB EXT CHLORIDE: 102
AMB EXT CHOLESTEROL, TOTAL: 218 MG/DL
AMB EXT CMP ALT: 24 U/L
AMB EXT CMP AST: 25 U/L
AMB EXT CREATININE: 0.68 MG/DL
AMB EXT EGFR NON-AA: 101
AMB EXT GLUCOSE: 89 MG/DL
AMB EXT HDL CHOLESTEROL: 43 MG/DL
AMB EXT HEMATOCRIT: 46.2
AMB EXT HEMOGLOBIN: 14.8
AMB EXT HGBA1C: 5.9 %
AMB EXT LDL CHOLESTEROL, DIRECT: 137 MG/DL
AMB EXT MCV: 87
AMB EXT PLATELETS: 288
AMB EXT POSTASSIUM: 4.3 MMOL/L
AMB EXT SODIUM: 140 MMOL/L
AMB EXT TOTAL PROTEIN: 7.1
AMB EXT TRIGLYCERIDES: 213 MG/DL
AMB EXT WBC: 6.9 X10(3)UL

## 2025-02-11 DIAGNOSIS — Z51.81 THERAPEUTIC DRUG MONITORING: ICD-10-CM

## 2025-02-11 DIAGNOSIS — Z79.899 ENCOUNTER FOR LONG-TERM CURRENT USE OF MEDICATION: ICD-10-CM

## 2025-02-11 DIAGNOSIS — F41.1 GAD (GENERALIZED ANXIETY DISORDER): ICD-10-CM

## 2025-02-11 RX ORDER — ESCITALOPRAM OXALATE 5 MG/1
5 TABLET ORAL DAILY
Qty: 90 TABLET | Refills: 0 | Status: SHIPPED | OUTPATIENT
Start: 2025-02-11

## 2025-03-25 ENCOUNTER — TELEPHONE (OUTPATIENT)
Dept: FAMILY MEDICINE CLINIC | Facility: CLINIC | Age: 60
End: 2025-03-25

## 2025-03-25 NOTE — TELEPHONE ENCOUNTER
Alba Babin  LOV: 7/26/2024       Alba calling experiencing:sinus infection, fever on and off, head pressure,   Duration/Onset of symptom:6 days   Appointment Offered: no appointments available

## 2025-03-25 NOTE — TELEPHONE ENCOUNTER
Patient has a sinus infection. Started with stomach, upset stomach started Friday. Now patient is having sinus congestion, coughing, low grade fevers of 99. Patient has been using her nasal spray which has been helping and will start nasal rinses. Patient is unsure if she needs an antibiotic at this time. Patient would like an appointment. Informed patient that Dr Maradiaga is out of the office today. Advised patient to walk in clinic, patient refused. Patient will wait for next available appointment on 3/27. Advised patient to take an at home Covid test and if positive to let this office know. Patient agreeable.

## 2025-03-27 ENCOUNTER — OFFICE VISIT (OUTPATIENT)
Dept: FAMILY MEDICINE CLINIC | Facility: CLINIC | Age: 60
End: 2025-03-27
Payer: COMMERCIAL

## 2025-03-27 VITALS
HEIGHT: 68 IN | DIASTOLIC BLOOD PRESSURE: 72 MMHG | WEIGHT: 196 LBS | SYSTOLIC BLOOD PRESSURE: 104 MMHG | HEART RATE: 84 BPM | RESPIRATION RATE: 16 BRPM | BODY MASS INDEX: 29.7 KG/M2 | OXYGEN SATURATION: 96 % | TEMPERATURE: 98 F

## 2025-03-27 DIAGNOSIS — J01.90 ACUTE RHINOSINUSITIS: Primary | ICD-10-CM

## 2025-03-27 DIAGNOSIS — R05.1 ACUTE COUGH: ICD-10-CM

## 2025-03-27 DIAGNOSIS — J30.9 ALLERGIC RHINITIS, UNSPECIFIED SEASONALITY, UNSPECIFIED TRIGGER: ICD-10-CM

## 2025-03-27 PROCEDURE — 3078F DIAST BP <80 MM HG: CPT | Performed by: FAMILY MEDICINE

## 2025-03-27 PROCEDURE — 3008F BODY MASS INDEX DOCD: CPT | Performed by: FAMILY MEDICINE

## 2025-03-27 PROCEDURE — 3074F SYST BP LT 130 MM HG: CPT | Performed by: FAMILY MEDICINE

## 2025-03-27 PROCEDURE — 99214 OFFICE O/P EST MOD 30 MIN: CPT | Performed by: FAMILY MEDICINE

## 2025-03-27 RX ORDER — CODEINE PHOSPHATE AND GUAIFENESIN 10; 100 MG/5ML; MG/5ML
SOLUTION ORAL 2 TIMES DAILY PRN
Qty: 237 ML | Refills: 0 | Status: SHIPPED | OUTPATIENT
Start: 2025-03-27

## 2025-03-27 RX ORDER — FLUTICASONE PROPIONATE 50 MCG
SPRAY, SUSPENSION (ML) NASAL
Qty: 16 G | Refills: 1 | Status: SHIPPED | OUTPATIENT
Start: 2025-03-27

## 2025-03-27 NOTE — PROGRESS NOTES
Chief Complaint   Patient presents with    Sinus Problem     1wk ago had vomiting and diarrhea for 1d.  6days of sinus congestion and pressure, cough,intermittent temps of 99F  Covid home test negative yesterday.             HPI:   Alba Babin is a 59 year old female who presents for upper respiratory symptoms for  7  days.   Flonase with some improvement.  Cough started about 3-4 days ago and progressively getting worse.  Hears crackling in nose and throat.  Chest feels a little tight.  Having sinus pressure and pain.  OTCs tried:  Nyquil, and Robitussin DM with  with partial temporary relief.  Coughed so hard she vomited.  Cough is clear to a chunk, somewhat productive.        Current Outpatient Medications   Medication Sig Dispense Refill    amoxicillin clavulanate 875-125 MG Oral Tab Take 1 tablet by mouth every 12 (twelve) hours for 7 days. 14 tablet 0    fluticasone propionate 50 MCG/ACT Nasal Suspension 2 sprays to each nostril once daily after shower. 16 g 1    guaiFENesin-codeine 100-10 MG/5ML Oral Solution Take 5-10 mL by mouth 2 (two) times daily as needed for cough or congestion. 237 mL 0    Eletriptan Hydrobromide 40 MG Oral Tab Take 1 tablet (40 mg total) by mouth as needed. Daily for migraines. Not to exceed two tablets in 24 hours 9 tablet 2    Calcium Carbonate 600 MG Oral Tab Take 1 tablet (600 mg total) by mouth.      Soy Protein-Soy Isoflavone (SOY CARE MENOPAUSE OR)       Flaxseed, Linseed, (FLAXSEED OIL MAX STR OR)       Collagen-Vitamin C-Biotin (COLLAGEN 1500/C OR)       Ascorbic Acid (VITAMIN C) 100 MG Oral Tab Take 1 tablet (100 mg total) by mouth daily.      Zinc 10 MG Mouth/Throat Lozenge Use as directed in the mouth or throat.      Cyanocobalamin (B-12) 100 MCG Oral Tab Take 100 mcg by mouth daily.      Cholecalciferol (VITAMIN D) 125 MCG (5000 UT) Oral Cap Take 1 capsule (5,000 Units total) by mouth at bedtime.      Multiple Vitamin (MULTI-VITAMIN) Oral Tab Take 1 tablet by mouth  daily.      Omega-3 Fatty Acids (FISH OIL) 500 MG Oral Cap Take by mouth.      Probiotic Product (PROBIOTIC-10 OR) Take by mouth twice a week.      Calcium-Magnesium 100-50 MG Oral Tab Take by mouth at bedtime.      Cetirizine HCl (ZYRTEC ALLERGY OR) Take by mouth as needed.        escitalopram 5 MG Oral Tab Take 1 tablet (5 mg total) by mouth daily. (Patient not taking: Reported on 3/27/2025) 90 tablet 0    Meloxicam 7.5 MG Oral Tab  (Patient not taking: Reported on 3/27/2025)        Past Medical History:    Allergic rhinitis    Anemia    Anxiety    Hx of migraines    Injection (erythema)    Dr.Michael Fisher    Iron deficiency    Overweight with body mass index (BMI) of 28 to 28.9 in adult    Seasonal allergies      Past Surgical History:   Procedure Laterality Date    Cataract      Colonoscopy      D & c            Tonsillectomy        Family History   Problem Relation Age of Onset    Hypertension Father     Hypertension Mother     Diabetes Mother     Other (alzheimer's) Mother     Dementia Mother     Cancer Maternal Grandmother         esophageal    Dementia Maternal Grandmother     Dementia Maternal Grandfather     Cancer Maternal Grandfather     Stroke Paternal Grandmother     Other (Other) Paternal Grandfather         emphysema    No Known Problems Daughter     No Known Problems Son     Glaucoma Brother     Hypertension Brother       Social History     Socioeconomic History    Marital status:    Tobacco Use    Smoking status: Former     Current packs/day: 0.00     Types: Cigarettes     Quit date:      Years since quittin.    Smokeless tobacco: Never   Vaping Use    Vaping status: Never Used   Substance and Sexual Activity    Alcohol use: Yes     Alcohol/week: 3.0 standard drinks of alcohol     Types: 3 Standard drinks or equivalent per week     Comment: socially    Drug use: No    Sexual activity: Yes     Partners: Male     Birth control/protection: Condom   Other Topics Concern     Caffeine Concern No    Exercise Yes     Comment: Walking    Seat Belt Yes    Special Diet No    Stress Concern Yes    Weight Concern Yes    Blood Transfusions No    Sleep Concern No         REVIEW OF SYSTEMS:   GENERAL: Positive for mild fatigue. No fevers, No chills, no body aches, no lethargy  SKIN: no rashes  EYES: denies change in vision  HENT: Positive for nasal congestion, post nasal drip, runny nose.   LUNGS: Denies AVIS or wheezing.  Positive for productive cough  CARDIOVASCULAR: denies chest pain  GI: denies GI symptoms  NEURO: denies headache/weakness    EXAM:   /72   Pulse 84   Temp 98.2 °F (36.8 °C) (Temporal)   Resp 16   Ht 5' 8\" (1.727 m)   Wt 196 lb (88.9 kg)   SpO2 96%   BMI 29.80 kg/m²   GENERAL: NAD, pleasant, mildly ill appearing, nontoxic  SKIN: no visible rashes  EYES: EOMI, conjunctiva are non-injected  HENT: Bilateral maxillary and frontal sinuses mildly tender to percussion.  EACs clear b/l, TMs normal b/l  NECK: supple, no cervical or supraclavicular adenopathy, no masses, no thyromegaly  LUNGS: CTA A/P, no wheezes, ronchi, rales or crackles  CARDIO: RRR no murmur  NEURO: A&Ox3  PSYCH:  Affect normal/appropriate    ASSESSMENT AND PLAN:   Alba Babin is a 59 year old female     Encounter Diagnoses   Name Primary?    Acute rhinosinusitis Yes    Acute cough     Allergic rhinitis, unspecified seasonality, unspecified trigger          1. Acute rhinosinusitis  2. Acute cough  Recommend push fluids.  Okay to take OTCs for symptomatic relief.  Guaifenesin-codeine solution to suppress cough.  -Recommend OTC decongestant Sudafed 12-hour formula.  -If no better at all or worse on Monday, 3/31/2025, okay to take the antibiotic Augmentin/amoxicillin-clavulanate.    - amoxicillin clavulanate 875-125 MG Oral Tab; Take 1 tablet by mouth every 12 (twelve) hours for 7 days.  Dispense: 14 tablet; Refill: 0  - fluticasone propionate 50 MCG/ACT Nasal Suspension; 2 sprays to each nostril once  daily after shower.  Dispense: 16 g; Refill: 1      - guaiFENesin-codeine 100-10 MG/5ML Oral Solution; Take 5-10 mL by mouth 2 (two) times daily as needed for cough or congestion.  Dispense: 237 mL; Refill: 0    3. Allergic rhinitis, unspecified seasonality, unspecified trigger  May contribute to patient developing secondary bacterial sinus infections.  Prescription for Flonase.    - fluticasone propionate 50 MCG/ACT Nasal Suspension; 2 sprays to each nostril once daily after shower.  Dispense: 16 g; Refill: 1          Meds & Refills for this Visit:  Requested Prescriptions     Signed Prescriptions Disp Refills    amoxicillin clavulanate 875-125 MG Oral Tab 14 tablet 0     Sig: Take 1 tablet by mouth every 12 (twelve) hours for 7 days.    fluticasone propionate 50 MCG/ACT Nasal Suspension 16 g 1     Si sprays to each nostril once daily after shower.    guaiFENesin-codeine 100-10 MG/5ML Oral Solution 237 mL 0     Sig: Take 5-10 mL by mouth 2 (two) times daily as needed for cough or congestion.       The patient verbalizes understanding of the treatment and agrees with the plan.  The patient is asked to return if symptoms persist or worsen.        Return in about 4 months (around 2025) for Annual wellness visit.  Sooner if needed.

## 2025-03-27 NOTE — PATIENT INSTRUCTIONS
-If no better at all or worse on Monday, 3/31/2025, okay to take the antibiotic Augmentin/amoxicillin-clavulanate.    -Recommend OTC decongestant Sudafed 12-hour formula.

## 2025-04-01 ENCOUNTER — TELEPHONE (OUTPATIENT)
Dept: FAMILY MEDICINE CLINIC | Facility: CLINIC | Age: 60
End: 2025-04-01

## 2025-04-01 RX ORDER — BENZONATATE 200 MG/1
200 CAPSULE ORAL 3 TIMES DAILY PRN
Qty: 15 CAPSULE | Refills: 0 | Status: SHIPPED | OUTPATIENT
Start: 2025-04-01

## 2025-04-01 NOTE — TELEPHONE ENCOUNTER
Spoke with Dr Maradiaga. Orders for benzonatate 200 mg 1 po TID PRN 15 capsules. Read back and verified.  Medication sent to patient's pharmacy.

## 2025-04-01 NOTE — TELEPHONE ENCOUNTER
Alba called to say she started antibiotic on Sunday. She is wondering if she can get something to help with cough. Her body is sore from coughing. She is wondering if she can get cough pearls. She would like to speak with nurse. She is a patient of Dr. Niya Maradiaga.

## 2025-04-01 NOTE — TELEPHONE ENCOUNTER
Called and spoke with patient. Patient is coughing so much that her muscles are hurting. She is feeling better with the antibiotics, but she is continuing to have coughing fits. Advised patient to drink hot tea with honey. Patient is requesting tessalon pearls.     Please review and advise.

## 2025-07-09 ENCOUNTER — OFFICE VISIT (OUTPATIENT)
Dept: FAMILY MEDICINE CLINIC | Facility: CLINIC | Age: 60
End: 2025-07-09
Payer: COMMERCIAL

## 2025-07-09 VITALS
BODY MASS INDEX: 27.57 KG/M2 | DIASTOLIC BLOOD PRESSURE: 60 MMHG | HEART RATE: 72 BPM | WEIGHT: 184 LBS | RESPIRATION RATE: 19 BRPM | HEIGHT: 68.31 IN | TEMPERATURE: 98 F | OXYGEN SATURATION: 97 % | SYSTOLIC BLOOD PRESSURE: 114 MMHG

## 2025-07-09 DIAGNOSIS — G43.909 MIGRAINE WITHOUT STATUS MIGRAINOSUS, NOT INTRACTABLE, UNSPECIFIED MIGRAINE TYPE: ICD-10-CM

## 2025-07-09 DIAGNOSIS — Z51.81 THERAPEUTIC DRUG MONITORING: ICD-10-CM

## 2025-07-09 DIAGNOSIS — M81.0 OSTEOPOROSIS OF LUMBAR SPINE: ICD-10-CM

## 2025-07-09 DIAGNOSIS — M85.89 OSTEOPENIA OF MULTIPLE SITES: ICD-10-CM

## 2025-07-09 DIAGNOSIS — F41.1 GAD (GENERALIZED ANXIETY DISORDER): ICD-10-CM

## 2025-07-09 DIAGNOSIS — Z79.899 ENCOUNTER FOR LONG-TERM CURRENT USE OF MEDICATION: ICD-10-CM

## 2025-07-09 DIAGNOSIS — Z00.00 ROUTINE GENERAL MEDICAL EXAMINATION AT A HEALTH CARE FACILITY: Primary | ICD-10-CM

## 2025-07-09 DIAGNOSIS — Z28.21 REFUSED PNEUMOCOCCAL VACCINATION: ICD-10-CM

## 2025-07-09 DIAGNOSIS — L72.0 EPIDERMOID CYST OF SKIN OF POSTAURICULAR REGION: ICD-10-CM

## 2025-07-09 DIAGNOSIS — Z12.31 ENCOUNTER FOR SCREENING MAMMOGRAM FOR MALIGNANT NEOPLASM OF BREAST: ICD-10-CM

## 2025-07-09 PROBLEM — R53.83 FATIGUE: Status: RESOLVED | Noted: 2024-07-26 | Resolved: 2025-07-09

## 2025-07-09 PROBLEM — E61.1 IRON DEFICIENCY: Status: RESOLVED | Noted: 2020-11-18 | Resolved: 2025-07-09

## 2025-07-09 PROBLEM — Z86.39 HISTORY OF IRON DEFICIENCY: Status: RESOLVED | Noted: 2024-07-26 | Resolved: 2025-07-09

## 2025-07-09 PROBLEM — E66.3 OVERWEIGHT WITH BODY MASS INDEX (BMI) OF 29 TO 29.9 IN ADULT: Status: RESOLVED | Noted: 2022-03-23 | Resolved: 2025-07-09

## 2025-07-09 PROBLEM — M62.81 MUSCLE WEAKNESS: Status: RESOLVED | Noted: 2024-05-21 | Resolved: 2025-07-09

## 2025-07-09 PROBLEM — M25.561 ARTHRALGIA OF RIGHT KNEE: Status: RESOLVED | Noted: 2024-05-21 | Resolved: 2025-07-09

## 2025-07-09 RX ORDER — ELETRIPTAN HYDROBROMIDE 40 MG/1
40 TABLET, FILM COATED ORAL AS NEEDED
Qty: 9 TABLET | Refills: 2 | Status: SHIPPED | OUTPATIENT
Start: 2025-07-09

## 2025-07-09 RX ORDER — ESCITALOPRAM OXALATE 5 MG/1
5 TABLET ORAL DAILY
Qty: 90 TABLET | Refills: 0 | Status: SHIPPED | OUTPATIENT
Start: 2025-07-09

## 2025-07-09 NOTE — PROGRESS NOTES
Chief Complaint   Patient presents with    Wellness Visit    Migraine    Allergic Rhinitis    Anxiety    Osteoporosis    Other     Bump behind left ear         HPI:   Alba Babin is a 60 year old female       Migraines:  Much better since in menopause.  Last used the relpax last month.  Trigger could be prob with sleep or occ with a glass of wine, change in barometric pressure.  In the last 6 months has had approx 3 migraines.  Not associated with change in vision.  Assoc with sensitivity to light and noise.      Allergic Rhinitis:  Usually hay fever season is when she needs the flonase and occ adds Zyrtec.  Uses otc flonase.      Anxiety:  Went off the lexapro for \"a while\".  Taking 5 mg of the lexapro.  Sleeping well.      Patient complains of a bump behind her left ear which was noticed by a friend she states when her hair was up her friend noticed that the area of prominence behind her left ear was more noticeable compared to the right.  Per patient the area is not tender to touch.      Symptoms: denies discharge, itching, burning or dysuria.   Has a bump she would like looked at.    Last PAP: UTD  Abnormal PAP: denies h/o abnl pap      Last colonoscopy: UTD  Last mammogram: Due, previously [facility] now [facility].          Wt Readings from Last 6 Encounters:   07/09/25 184 lb (83.5 kg)   03/27/25 196 lb (88.9 kg)   07/26/24 194 lb (88 kg)   03/04/24 188 lb (85.3 kg)   10/26/23 181 lb 12.8 oz (82.5 kg)   09/29/23 (P) 182 lb 6.4 oz (82.7 kg)     Body mass index is 27.72 kg/m².       Problem List[1]  Current Medications[2]   Past Medical History[3]   Past Surgical History[4]   Family History[5]   Social History:   Short Social Hx on File[6]      Occ: [employer] as . Marital Status: . Children: 2.   Exercise: cardio and strength 30 mins twice a week  Diet: tries to watch her diet, trying to eat less \"junk\", eating more more salads     REVIEW OF  SYSTEMS:   GENERAL: Overall feels well  INTEGUMENT: denies any unusual skin lesions or rashes  EYES: denies vision changes  HEENT: denies upper respiratory symptoms  LUNGS: denies cough or shortness of breath with exertion  CHEST:  denies breast changes or pain  CARDIOVASCULAR: denies chest pain or tightness on exertion  VASCULAR: No lower extremity swelling  GI: denies abdominal pain, bowel movement changes, blood in stool  : No complaint of urinary problems, vaginal discharge or discomfort  MUSCULOSKELETAL: denies joint pain   NEURO: denies headaches or dizziness  PSYCH: denies depression/SI/HI  ENDOCRINE: No complaints of temperature intolerance, polyuria, or excessive sweating.  LYMPHATICS: No complaints of swollen glands      EXAM:   /60   Pulse 72   Temp 97.8 °F (36.6 °C) (Temporal)   Resp 19   Ht 5' 8.31\" (1.735 m)   Wt 184 lb (83.5 kg)   SpO2 97%   BMI 27.72 kg/m²   Body mass index is 27.72 kg/m².   GENERAL: NAD, Pleasant well appearing  female.  INTEGUMENT: No visible rashes or suspicious lesions.  Posterior to left ear there is a a subcutaneous mass approximately 1-1/2 to 2 cm that is not movable and is nontender to palpation, overlying skin is normal.  HEENT: atraumatic, normocephalic, EACs and TMs clear normal bilaterally.  Nose: No nasal discharge.  OP: MMM.  Posteriorly no exudate or erythema.  EYES: EOMI, sclera, conjunctiva are clear  NECK: supple, no adenopathy/thyromegaly/masses  CHEST: no chest tenderness  BREAST: Symmetric. No suspicious mass, no peau d'orange, no nipple retraction or nipple discharge.  No tenderness to palpation.  LUNGS: Clear to auscultation bilateral, no rales, rhonchi or wheezing  CARDIO: RRR without murmur normal S1S2  ABD:  Soft, non tender to palpation.  No masses, HSM, or pulsations appreciated.  : External genitalia: No visible rashes.  Externally to introitus on the left laterally and inferiorly there is a small palpable mass that is nontender  to palpation, overlying skin is normal.  MUSCULOSKELETAL: gait normal, no gross M/S defect.  EXTREMITIES: no clubbing, cyanosis, or edema  NEURO: Alert and oriented x3.  No gross motor or sensory deficit.  PSYCH: normal affect      Immunization History   Administered Date(s) Administered    Covid-19 Vaccine Moderna 100 mcg/0.5 ml 03/15/2021, 04/12/2021    FLULAVAL 6 months & older 0.5 ml Prefilled syringe ([id number]) 10/21/2020    Fluvirin, 3 Years & >, Im 10/01/2017    HEP B 02/21/2017, 04/09/2017, 10/03/2017    Influenza 11/08/2023    TDAP 03/19/2018    Zoster Vaccine Recombinant Adjuvanted (Shingrix) 09/29/2023, 03/04/2024       DATA:      Interpretation   PROCEDURE:  XR DEXA BONE DENSITOMETRY (CPT=77080)     COMPARISON:  None.     INDICATIONS:  Z78.0 Postmenopausal     PATIENT STATED HISTORY: (As transcribed by Technologist)  Screening for osteoporosis.            LUMBAR SPINE ANALYSIS RESULTS:      Bone mineral density (BMD) (g/cm2):  0.748    Lumbar T-Score:  -2.7      % young normals:  71      % age matched controls:  82      Change from prior spine examination:  n/a               TOTAL HIP ANALYSIS RESULTS:        Bone mineral density (BMD) (g/cm2):  0.787      Total Hip T-Score:  -1.3      % young normals:  84      % age matched controls:  93      Change from prior hip examination:  n/a               FEMORAL NECK ANALYSIS RESULTS:        Bone mineral density (BMD) (g/cm2):  0.649      Femoral neck T-Score:  -1.8      % young normals:  76      % age matched controls:  90      Change from prior hip examination:  n/a            ADDITIONAL FINDINGS:  No significant additional findings.       =====  CONCLUSION:  Bone mineral density values for the lumbar spine are compatible with the diagnosis of osteoporosis by WHO definition (T score less than -2.5). If therapy is initiated, a follow-up study in 1 year may aid in evaluation of therapeutic   efficacy.      The World Health Organization has defined the following  categories based on bone density:  Normal bone:  T-score better than -1  Osteopenia: T-score between -1 and -2.5  Osteoporosis:  T-score less than -2.5     LOCATION:  [facility]    Dictated by (CST): Esteban Fabian MD on 10/13/2023 at 5:25 PM       Finalized by (CST): Esteban Fabian MD on 10/13/2023 at 5:25 PM         Lab Results   Component Value Date    A1C 5.9 10/26/2024    A1C 5.9 (H) 08/12/2024    A1C 5.8 (H) 06/19/2023     08/12/2024     06/19/2023     Lab Results   Component Value Date    CHOLEST 218 10/26/2024    CHOLEST 213 (H) 08/12/2024    CHOLEST 195 06/19/2023     Lab Results   Component Value Date    HDL 43 10/26/2024    HDL 47 08/12/2024    HDL 52 06/19/2023     Lab Results   Component Value Date     (H) 08/12/2024     (H) 06/19/2023     (H) 04/15/2022     Lab Results   Component Value Date    TRIG 213 10/26/2024    TRIG 197 (H) 08/12/2024    TRIG 120 06/19/2023     Lab Results   Component Value Date    AST 25 10/26/2024    AST 26 08/12/2024    AST 28 06/19/2023     Lab Results   Component Value Date    ALT 24 10/26/2024    ALT 22 08/12/2024    ALT 32 06/19/2023           ASSESSMENT AND PLAN:   Alba Babin is a 60 year old female who presents for a complete physical exam.        Encounter Diagnoses   Name Primary?    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        1. Routine general medical examination at a health care facility  Patient provided handout on women's health and prevention.   Routine health profile labs pending.  Recommend healthy diet including green leafy vegetables, fresh fruits and lean protein.  Aerobic exercise for total of 150 minutes/week is recommended for cardiovascular fitness, and 45-60 minutes 6-7 days a week to help obtain weight loss.   Recommend age-appropriate calcium and vitamin D intake.    - CBC With Differential With Platelet; Future  - Comp Metabolic Panel (14); Future  - Lipid Panel; Future  - Assay, Thyroid Stim Hormone; Future  - Free T4, (Free Thyroxine); Future    2. Encounter for screening mammogram for malignant neoplasm of breast  Screening mammogram ordered and pending.    - [facility] MARY ANN 2D+3D SCREENING BILAT (CPT=77067/19825); Future    3. Refused pneumococcal vaccination  Patient refuses pneumococcal vaccination.    4. Therapeutic drug monitoring  5. PAT (generalized anxiety disorder)  Patient discontinued escitalopram on her own she states for \"a while\" and then restarted it as it seemed beneficial.  Shared decision making in continuing escitalopram as below.  Follow-up in 6 months.    - escitalopram 5 MG Oral Tab; Take 1 tablet (5 mg total) by mouth daily.  Dispense: 90 tablet; Refill: 0    6. Migraine without status migrainosus, not intractable, unspecified migraine type  Recommend maintain good hydration and avoid dehydration.  Recommend good sleep hygiene.  Avoid known triggers as able.  Continue Eletriptan as needed for abortive measures.  Follow-up on migraines in 6 to 12 months.    - Eletriptan Hydrobromide 40 MG Oral Tab; Take 1 tablet (40 mg total) by mouth as needed. Daily for migraines. Not to exceed two tablets in 24 hours  Dispense: 9 tablet; Refill: 2    7. Encounter for long-term current use of medication  Medication use, risks, benefits, side effects and precautions discussed, patient  verbalizes understanding. Questions encouraged and answered to patient's satisfaction.    - Eletriptan Hydrobromide 40 MG Oral Tab; Take 1 tablet (40 mg total) by mouth as needed. Daily for migraines. Not to exceed two tablets in 24 hours  Dispense: 9 tablet; Refill: 2  - escitalopram 5 MG Oral Tab; Take 1 tablet (5 mg total) by mouth daily.  Dispense: 90 tablet; Refill: 0    8. Osteoporosis of lumbar spine  9. Osteopenia of multiple sites  Recommend reevaluate with DEXA, earliest insurance will cover likely is 10/14/2025.  Recommend continue calcium and vitamin D supplements.  Recommend daily weightbearing exercise such as walking and strengthening exercises.    - XR DEXA BONE DENSITOMETRY (CPT=77080); Future    10. Epidermoid cyst of skin of postauricular region  Versus pilar cyst versus other. Left.  Patient referred to Dr. Gongora, ENT, for further evaluation and care/definitive treatment.    - ENT Referral - In Network          Orders Placed This Encounter   Procedures    CBC With Differential With Platelet    Comp Metabolic Panel (14)    Lipid Panel    Assay, Thyroid Stim Hormone    Free T4, (Free Thyroxine)       Meds & Refills for this Visit:  Requested Prescriptions     Signed Prescriptions Disp Refills    Eletriptan Hydrobromide 40 MG Oral Tab 9 tablet 2     Sig: Take 1 tablet (40 mg total) by mouth as needed. Daily for migraines. Not to exceed two tablets in 24 hours    escitalopram 5 MG Oral Tab 90 tablet 0     Sig: Take 1 tablet (5 mg total) by mouth daily.       Imaging & Consults:  ENT - INTERNAL  [facility] MARY ANN 2D+3D SCREENING BILAT (CPT=77067/21818)  XR DEXA BONE DENSITOMETRY (CPT=77080)    Return in about 6 months (around 1/9/2026) for Anxiety.  Sooner if needed.                          [1]   Patient Active Problem List  Diagnosis    Migraine without status migrainosus, not intractable    Hypercholesterolemia    PAT (generalized anxiety disorder)    Encounter for long-term current use of medication     Therapeutic drug monitoring    Prediabetes    Osteoporosis of lumbar spine    Osteopenia of multiple sites    Right medial knee pain    Stiffness of right knee    Decreased libido    Epidermoid cyst of skin of postauricular region   [2]   Current Outpatient Medications   Medication Sig Dispense Refill    Eletriptan Hydrobromide 40 MG Oral Tab Take 1 tablet (40 mg total) by mouth as needed. Daily for migraines. Not to exceed two tablets in 24 hours 9 tablet 2    escitalopram 5 MG Oral Tab Take 1 tablet (5 mg total) by mouth daily. 90 tablet 0    fluticasone propionate 50 MCG/ACT Nasal Suspension 2 sprays to each nostril once daily after shower. 16 g 1    Calcium Carbonate 600 MG Oral Tab Take 1 tablet (600 mg total) by mouth.      Soy Protein-Soy Isoflavone (SOY CARE MENOPAUSE OR)       Flaxseed, Linseed, (FLAXSEED OIL MAX STR OR)       Collagen-Vitamin C-Biotin (COLLAGEN 1500/C OR)       Ascorbic Acid (VITAMIN C) 100 MG Oral Tab Take 1 tablet (100 mg total) by mouth daily.      Zinc 10 MG Mouth/Throat Lozenge Use as directed in the mouth or throat.      Cyanocobalamin (B-12) 100 MCG Oral Tab Take 100 mcg by mouth daily.      Cholecalciferol (VITAMIN D) 125 MCG (5000 UT) Oral Cap Take 1 capsule (5,000 Units total) by mouth at bedtime.      Multiple Vitamin (MULTI-VITAMIN) Oral Tab Take 1 tablet by mouth daily.      Omega-3 Fatty Acids (FISH OIL) 500 MG Oral Cap Take by mouth.      Probiotic Product (PROBIOTIC-10 OR) Take by mouth twice a week.      Calcium-Magnesium 100-50 MG Oral Tab Take by mouth at bedtime.      Cetirizine HCl (ZYRTEC ALLERGY OR) Take by mouth as needed.       [3]   Past Medical History:   Allergic rhinitis    Anemia    Anxiety    Arthralgia of right knee    History of iron deficiency    Hx of migraines    Injection (erythema)    Dr.Michael Fisher    Iron deficiency    Overweight with body mass index (BMI) of 28 to 28.9 in adult    Seasonal allergies   [4]   Past Surgical History:  Procedure  Laterality Date    Cataract  2016    Colonoscopy      D & c            Tonsillectomy     [5]   Family History  Problem Relation Age of Onset    Hypertension Father     Hypertension Mother     Diabetes Mother     Other (alzheimer's) Mother     Dementia Mother     Cancer Maternal Grandmother         esophageal    Dementia Maternal Grandmother     Dementia Maternal Grandfather     Cancer Maternal Grandfather     Stroke Paternal Grandmother     Other (Other) Paternal Grandfather         emphysema    No Known Problems Daughter     No Known Problems Son     Glaucoma Brother     Hypertension Brother    [6]   Social History  Socioeconomic History    Marital status:    Tobacco Use    Smoking status: Former    Smokeless tobacco: Never   Vaping Use    Vaping status: Never Used   Substance and Sexual Activity    Alcohol use: Yes     Alcohol/week: 3.0 standard drinks of alcohol     Types: 3 Standard drinks or equivalent per week     Comment: socially    Drug use: No    Sexual activity: Yes     Partners: Male     Birth control/protection: Condom   Other Topics Concern    Caffeine Concern No    Exercise Yes     Comment: Walking    Seat Belt Yes    Special Diet No    Stress Concern Yes    Weight Concern Yes    Blood Transfusions No    Sleep Concern No

## 2025-07-25 ENCOUNTER — HOSPITAL ENCOUNTER (OUTPATIENT)
Dept: MAMMOGRAPHY | Age: 60
Discharge: HOME | End: 2025-07-25
Attending: FAMILY MEDICINE
Payer: COMMERCIAL

## 2025-07-25 DIAGNOSIS — Z12.31 ENCOUNTER FOR SCREENING MAMMOGRAM FOR MALIGNANT NEOPLASM OF BREAST: ICD-10-CM

## 2025-07-25 PROCEDURE — 77063 BREAST TOMOSYNTHESIS BI: CPT | Performed by: FAMILY MEDICINE

## 2025-07-25 PROCEDURE — 77067 SCR MAMMO BI INCL CAD: CPT | Performed by: FAMILY MEDICINE

## 2025-07-29 ENCOUNTER — OFFICE VISIT (OUTPATIENT)
Facility: LOCATION | Age: 60
End: 2025-07-29
Payer: COMMERCIAL

## 2025-07-29 DIAGNOSIS — R22.0 MASS OF POSTAURICULAR AREA: Primary | ICD-10-CM

## 2025-07-29 PROCEDURE — 99203 OFFICE O/P NEW LOW 30 MIN: CPT | Performed by: STUDENT IN AN ORGANIZED HEALTH CARE EDUCATION/TRAINING PROGRAM

## 2025-08-02 ENCOUNTER — LAB ENCOUNTER (OUTPATIENT)
Dept: LAB | Age: 60
End: 2025-08-02
Attending: FAMILY MEDICINE

## 2025-08-02 DIAGNOSIS — Z00.00 ROUTINE GENERAL MEDICAL EXAMINATION AT A HEALTH CARE FACILITY: ICD-10-CM

## 2025-08-02 LAB
ALBUMIN SERPL-MCNC: 4.5 G/DL (ref 3.2–4.8)
ALBUMIN/GLOB SERPL: 1.4 (ref 1–2)
ALP LIVER SERPL-CCNC: 81 U/L (ref 46–118)
ALT SERPL-CCNC: 22 U/L (ref 10–49)
ANION GAP SERPL CALC-SCNC: 9 MMOL/L (ref 0–18)
AST SERPL-CCNC: 28 U/L (ref ?–34)
BASOPHILS # BLD AUTO: 0.06 X10(3) UL (ref 0–0.2)
BASOPHILS NFR BLD AUTO: 0.8 %
BILIRUB SERPL-MCNC: 0.7 MG/DL (ref 0.3–1.2)
BUN BLD-MCNC: 12 MG/DL (ref 9–23)
CALCIUM BLD-MCNC: 9.6 MG/DL (ref 8.7–10.6)
CHLORIDE SERPL-SCNC: 103 MMOL/L (ref 98–112)
CHOLEST SERPL-MCNC: 203 MG/DL (ref ?–200)
CO2 SERPL-SCNC: 29 MMOL/L (ref 21–32)
CREAT BLD-MCNC: 0.77 MG/DL (ref 0.55–1.02)
EGFRCR SERPLBLD CKD-EPI 2021: 89 ML/MIN/1.73M2 (ref 60–?)
EOSINOPHIL # BLD AUTO: 0.17 X10(3) UL (ref 0–0.7)
EOSINOPHIL NFR BLD AUTO: 2.3 %
ERYTHROCYTE [DISTWIDTH] IN BLOOD BY AUTOMATED COUNT: 12.9 %
FASTING PATIENT LIPID ANSWER: YES
FASTING STATUS PATIENT QL REPORTED: YES
GLOBULIN PLAS-MCNC: 3.2 G/DL (ref 2–3.5)
GLUCOSE BLD-MCNC: 80 MG/DL (ref 70–99)
HCT VFR BLD AUTO: 46.9 % (ref 35–48)
HDLC SERPL-MCNC: 50 MG/DL (ref 40–59)
HGB BLD-MCNC: 15.2 G/DL (ref 12–16)
IMM GRANULOCYTES # BLD AUTO: 0.02 X10(3) UL (ref 0–1)
IMM GRANULOCYTES NFR BLD: 0.3 %
LDLC SERPL CALC-MCNC: 127 MG/DL (ref ?–100)
LYMPHOCYTES # BLD AUTO: 1.72 X10(3) UL (ref 1–4)
LYMPHOCYTES NFR BLD AUTO: 23.4 %
MCH RBC QN AUTO: 27.4 PG (ref 26–34)
MCHC RBC AUTO-ENTMCNC: 32.4 G/DL (ref 31–37)
MCV RBC AUTO: 84.5 FL (ref 80–100)
MONOCYTES # BLD AUTO: 0.74 X10(3) UL (ref 0.1–1)
MONOCYTES NFR BLD AUTO: 10.1 %
NEUTROPHILS # BLD AUTO: 4.64 X10 (3) UL (ref 1.5–7.7)
NEUTROPHILS # BLD AUTO: 4.64 X10(3) UL (ref 1.5–7.7)
NEUTROPHILS NFR BLD AUTO: 63.1 %
NONHDLC SERPL-MCNC: 153 MG/DL (ref ?–130)
OSMOLALITY SERPL CALC.SUM OF ELEC: 291 MOSM/KG (ref 275–295)
PLATELET # BLD AUTO: 286 10(3)UL (ref 150–450)
POTASSIUM SERPL-SCNC: 4.1 MMOL/L (ref 3.5–5.1)
PROT SERPL-MCNC: 7.7 G/DL (ref 5.7–8.2)
RBC # BLD AUTO: 5.55 X10(6)UL (ref 3.8–5.3)
SODIUM SERPL-SCNC: 141 MMOL/L (ref 136–145)
T4 FREE SERPL-MCNC: 1.6 NG/DL (ref 0.8–1.7)
TRIGL SERPL-MCNC: 147 MG/DL (ref 30–149)
TSI SER-ACNC: 0.97 UIU/ML (ref 0.55–4.78)
VLDLC SERPL CALC-MCNC: 26 MG/DL (ref 0–30)
WBC # BLD AUTO: 7.4 X10(3) UL (ref 4–11)

## 2025-08-02 PROCEDURE — 80061 LIPID PANEL: CPT

## 2025-08-02 PROCEDURE — 84443 ASSAY THYROID STIM HORMONE: CPT

## 2025-08-02 PROCEDURE — 84439 ASSAY OF FREE THYROXINE: CPT

## 2025-08-02 PROCEDURE — 80053 COMPREHEN METABOLIC PANEL: CPT

## 2025-08-02 PROCEDURE — 85025 COMPLETE CBC W/AUTO DIFF WBC: CPT

## 2025-08-02 PROCEDURE — 36415 COLL VENOUS BLD VENIPUNCTURE: CPT

## 2025-08-14 ENCOUNTER — HOSPITAL ENCOUNTER (OUTPATIENT)
Dept: ULTRASOUND IMAGING | Age: 60
Discharge: HOME | End: 2025-08-14
Attending: STUDENT IN AN ORGANIZED HEALTH CARE EDUCATION/TRAINING PROGRAM

## 2025-08-14 DIAGNOSIS — R22.0 MASS OF POSTAURICULAR AREA: ICD-10-CM

## 2025-08-14 PROCEDURE — 76536 US EXAM OF HEAD AND NECK: CPT | Performed by: STUDENT IN AN ORGANIZED HEALTH CARE EDUCATION/TRAINING PROGRAM

## 2025-08-25 ENCOUNTER — HOSPITAL ENCOUNTER (OUTPATIENT)
Dept: MAMMOGRAPHY | Age: 60
Discharge: HOME | End: 2025-08-25
Attending: FAMILY MEDICINE

## 2025-08-25 DIAGNOSIS — R92.2 INCONCLUSIVE MAMMOGRAM: ICD-10-CM
